# Patient Record
Sex: MALE | Race: WHITE | NOT HISPANIC OR LATINO | Employment: FULL TIME | URBAN - METROPOLITAN AREA
[De-identification: names, ages, dates, MRNs, and addresses within clinical notes are randomized per-mention and may not be internally consistent; named-entity substitution may affect disease eponyms.]

---

## 2018-04-12 ENCOUNTER — HOSPITAL ENCOUNTER (OUTPATIENT)
Dept: RADIOLOGY | Facility: MEDICAL CENTER | Age: 58
End: 2018-04-12
Attending: INTERNAL MEDICINE
Payer: COMMERCIAL

## 2018-04-12 DIAGNOSIS — D75.1 POLYCYTHEMIA, SECONDARY: ICD-10-CM

## 2018-04-12 PROCEDURE — 76700 US EXAM ABDOM COMPLETE: CPT

## 2018-05-18 ENCOUNTER — SLEEP CENTER VISIT (OUTPATIENT)
Dept: SLEEP MEDICINE | Facility: MEDICAL CENTER | Age: 58
End: 2018-05-18
Payer: COMMERCIAL

## 2018-05-18 VITALS
OXYGEN SATURATION: 93 % | HEIGHT: 77 IN | RESPIRATION RATE: 15 BRPM | SYSTOLIC BLOOD PRESSURE: 130 MMHG | DIASTOLIC BLOOD PRESSURE: 84 MMHG | HEART RATE: 79 BPM | BODY MASS INDEX: 29.64 KG/M2 | WEIGHT: 251 LBS

## 2018-05-18 DIAGNOSIS — D75.1 POLYCYTHEMIA: ICD-10-CM

## 2018-05-18 DIAGNOSIS — G47.30 SLEEP DISORDER BREATHING: ICD-10-CM

## 2018-05-18 PROCEDURE — 99203 OFFICE O/P NEW LOW 30 MIN: CPT | Performed by: FAMILY MEDICINE

## 2018-05-18 RX ORDER — OMEPRAZOLE 40 MG/1
40 CAPSULE, DELAYED RELEASE ORAL
Refills: 0 | COMMUNITY
Start: 2018-03-22

## 2018-05-18 RX ORDER — LEVOTHYROXINE SODIUM 175 UG/1
175 TABLET ORAL
Refills: 1 | COMMUNITY
Start: 2018-04-04

## 2018-05-18 RX ORDER — SIMVASTATIN 40 MG
40 TABLET ORAL
Refills: 5 | COMMUNITY
Start: 2018-05-01

## 2018-05-18 NOTE — PROGRESS NOTES
"     [facility]  Consult Note     Date: 5/18/2018 / Time: 8:14 AM    Patient ID:   Name:             Abner Magdaleno   YOB: 1960  Age:                 57 y.o.  male   MRN:               [id number]      Thank you for requesting a sleep medicine consultation on Abner Magdaleno at the sleep center. He presents today with the chief complaints of intermittent snoring and occasional excessive daytime sleepiness. He is referred by Dr. Petersen for evaluation and treatment of sleep disorder breathing . He was recently diagnosed with polycythemia.     HISTORY OF PRESENT ILLNESS:       At night,  Abner Magdaleno goes to bed around 11-12 pm on weekdays and on weekends. He gets out of bed at 7-8 am on weekdays and on weekends.  He averages 7 hrs of sleep on a good night and 5 hrs on a bad night. Pt has bad nights 1 nights per week. He falls asleep within 30 minutes. He awakens 2 times a night due to bathroom. It takes him few min to fall back asleep.      Rasta is aware of intermittent snoring but denies witnessed apneas/gasping or choking in sleep.  He  denies any symptoms of restless legs syndrome such as an \"urge to move\"  He  legs in the evening or bedtime. He  denies any symptoms of narcolepsy such as sleep paralysis or cataplexy, or any symptoms to suggest parasomnias such as sleep walking or acting out of dreams. He  has not used any medications for her sleep problem.    Overall,  Destiny finds his sleep refreshing. When He  wakes up in the morning, He does feel tired. In terms of  excessive daytime sleepiness,  He complains of sleepiness while reading or watching TV . [place] sleepiness scale score is abnormal at  7/24.   He  Does not take regular naps.   REVIEW OF SYSTEMS:       Constitutional: Denies fevers, Denies weight changes  Eyes: Denies changes in vision, no eye pain  Ears/Nose/Throat/Mouth: Denies nasal congestion or sore throat   Cardiovascular: Denies chest pain or palpitations " "  Respiratory: Denies shortness of breath , Denies cough  Gastrointestinal/Hepatic: Denies abdominal pain, nausea, vomiting, diarrhea, constipation or GI bleeding   Genitourinary: Denies bladder dysfunction, dysuria or frequency  Musculoskeletal/Rheum: Denies  joint pain and swelling   Skin/Breast: Denies rash, denies breast lumps or discharge  Neurological: Denies headache, confusion, memory loss or focal weakness/parasthesias  Psychiatric: denies mood disorder     Comprehensive review of systems form is reviewed with the patient and is attached in the EMR.     PMH:  has no past medical history on file.  MEDS:   Current Outpatient Prescriptions:   •  LEVOXYL 175 MCG Tab, Take 175 mcg by mouth every day., Disp: , Rfl: 1  •  omeprazole (PRILOSEC) 40 MG delayed-release capsule, Take 40 mg by mouth every day., Disp: , Rfl: 0  •  simvastatin (ZOCOR) 40 MG Tab, Take 40 mg by mouth every day., Disp: , Rfl: 5  •  Cholecalciferol (VITAMIN D PO), Take  by mouth., Disp: , Rfl:   ALLERGIES: No Known Allergies  SURGHX: History reviewed. No pertinent surgical history.  SOCHX:  reports that he has never smoked. He has never used smokeless tobacco. He reports that he drinks about 1.8 oz of alcohol per week . He reports that he does not use drugs..   FH:   Family History   Problem Relation Age of Onset   • Lung Cancer Mother    • Heart Attack Father    • Sleep Apnea Neg Hx            Physical Exam:  Vitals/ General Appearance:   Weight/BMI: Body mass index is 29.76 kg/m².  Blood pressure 130/84, pulse 79, resp. rate 15, height 1.956 m (6' 5\"), weight 113.9 kg (251 lb), SpO2 93 %.  Vitals:    05/18/18 0800   BP: 130/84   Pulse: 79   Resp: 15   SpO2: 93%   Weight: 113.9 kg (251 lb)   Height: 1.956 m (6' 5\")       Pt. is alert and oriented to time, place and person. Cooperative and in no apparent distress.       1. Head: Atraumatic, normocephalic.   2. Ears: Normal tympanic membrane and no discharge  3. Nose: No inferior turbinate " hypertophy, no septal deviation, no polyp.   4. Throat: Oropharynx appears crowded in that the palate is overhanging (Malam Evelin scale 4. Tonsils are 2+, uvula is large, pharynx not inflamed. Tongue is large. has intact dentition and oral hygiene is fair.  5. Neck: Supple. No thyromegaly  6. Chest: Trachea central  7. Lungs auscultation: B/L good air entry, vesicular breath sounds, no adventitious sounds  8. Heart auscultation: 1st and 2nd heart sounds normal, regular rhythm. No appreciable murmur.  9. Abdomen: Soft, non tender, no organomegaly. Bowel sounds present  10. Extremities:  no pedal edema.   Peripheral pulses felt.  11. Skin: No rash  12. NEUROLOGICAL EXAMINATION: On neurological exam, the patient was alert and oriented x3. speech was clear and fluent without dysarthria. INVESTIGATIONS:       ASSESSMENT AND PLAN     1. He  has symptoms of Obstructive Sleep Apnea (ROB). He  has excessive daytime sleepiness that interferes with activites of daily living. He  risk factors for ROB includeand crowded oropharynx.     The pathophysiology of ROB and the increased risk of cardiovascular morbidity from untreated ROB is discussed in detail with the patient. He  also has polycythemia which can be worsened by her ROB.     We have discussed diagnostic options including in-laboratory, attended polysomnography and home sleep testing. We have also discussed treatment options including airway pressurization, reconstructive otolaryngologic surgery, dental appliances and weight management.       Subsequently,treatment options will be discussed based on the diagnostic study. Meanwhile, He is urged to avoid supine sleep, weight gain and alcoholic beverages since all of these can worsen ROB. He is cautioned against drowsy driving. If He feels sleepy while driving, He must pull over for a break/nap, rather than persist on the road, in the interest of He own safety and that of others on the road.    Plan  -  He  will be  scheduled for an overnight  HST to assess sleep related  breathing disorder.    2. . Regarding treatment of other past medical problems and general health maintenance,  He is urged to follow up with PCP.

## 2018-05-18 NOTE — PATIENT INSTRUCTIONS
`Sleep Apnea  Sleep apnea is a condition in which breathing pauses or becomes shallow during sleep. Episodes of sleep apnea usually last 10 seconds or longer, and they may occur as many as 20 times an hour. Sleep apnea disrupts your sleep and keeps your body from getting the rest that it needs. This condition can increase your risk of certain health problems, including:  · Heart attack.  · Stroke.  · Obesity.  · Diabetes.  · Heart failure.  · Irregular heartbeat.  There are three kinds of sleep apnea:  · Obstructive sleep apnea. This kind is caused by a blocked or collapsed airway.  · Central sleep apnea. This kind happens when the part of the brain that controls breathing does not send the correct signals to the muscles that control breathing.  · Mixed sleep apnea. This is a combination of obstructive and central sleep apnea.  What are the causes?  The most common cause of this condition is a collapsed or blocked airway. An airway can collapse or become blocked if:  · Your throat muscles are abnormally relaxed.  · Your tongue and tonsils are larger than normal.  · You are overweight.  · Your airway is smaller than normal.  What increases the risk?  This condition is more likely to develop in people who:  · Are overweight.  · Smoke.  · Have a smaller than normal airway.  · Are elderly.  · Are male.  · Drink alcohol.  · Take sedatives or tranquilizers.  · Have a family history of sleep apnea.  What are the signs or symptoms?  Symptoms of this condition include:  · Trouble staying asleep.  · Daytime sleepiness and tiredness.  · Irritability.  · Loud snoring.  · Morning headaches.  · Trouble concentrating.  · Forgetfulness.  · Decreased interest in sex.  · Unexplained sleepiness.  · Mood swings.  · Personality changes.  · Feelings of depression.  · Waking up often during the night to urinate.  · Dry mouth.  · Sore throat.  How is this diagnosed?  This condition may be diagnosed with:  · A medical history.  · A physical  exam.  · A series of tests that are done while you are sleeping (sleep study). These tests are usually done in a sleep lab, but they may also be done at home.  How is this treated?  Treatment for this condition aims to restore normal breathing and to ease symptoms during sleep. It may involve managing health issues that can affect breathing, such as high blood pressure or obesity. Treatment may include:  · Sleeping on your side.  · Using a decongestant if you have nasal congestion.  · Avoiding the use of depressants, including alcohol, sedatives, and narcotics.  · Losing weight if you are overweight.  · Making changes to your diet.  · Quitting smoking.  · Using a device to open your airway while you sleep, such as:  ¨ An oral appliance. This is a custom-made mouthpiece that shifts your lower jaw forward.  ¨ A continuous positive airway pressure (CPAP) device. This device delivers oxygen to your airway through a mask.  ¨ A nasal expiratory positive airway pressure (EPAP) device. This device has valves that you put into each nostril.  ¨ A bi-level positive airway pressure (BPAP) device. This device delivers oxygen to your airway through a mask.  · Surgery if other treatments do not work. During surgery, excess tissue is removed to create a wider airway.  It is important to get treatment for sleep apnea. Without treatment, this condition can lead to:  · High blood pressure.  · Coronary artery disease.  · (Men) An inability to achieve or maintain an erection (impotence).  · Reduced thinking abilities.  Follow these instructions at home:  · Make any lifestyle changes that your health care provider recommends.  · Eat a healthy, well-balanced diet.  · Take over-the-counter and prescription medicines only as told by your health care provider.  · Avoid using depressants, including alcohol, sedatives, and narcotics.  · Take steps to lose weight if you are overweight.  · If you were given a device to open your airway while you  sleep, use it only as told by your health care provider.  · Do not use any tobacco products, such as cigarettes, chewing tobacco, and e-cigarettes. If you need help quitting, ask your health care provider.  · Keep all follow-up visits as told by your health care provider. This is important.  Contact a health care provider if:  · The device that you received to open your airway during sleep is uncomfortable or does not seem to be working.  · Your symptoms do not improve.  · Your symptoms get worse.  Get help right away if:  · You develop chest pain.  · You develop shortness of breath.  · You develop discomfort in your back, arms, or stomach.  · You have trouble speaking.  · You have weakness on one side of your body.  · You have drooping in your face.  These symptoms may represent a serious problem that is an emergency. Do not wait to see if the symptoms will go away. Get medical help right away. Call your local emergency services (911 in the [place] Do not drive yourself to the hospital.   This information is not intended to replace advice given to you by your health care provider. Make sure you discuss any questions you have with your health care provider.  Document Released: 12/08/2003 Document Revised: 08/13/2017 Document Reviewed: 09/26/2016  Elsevier Interactive Patient Education © 2017 [employer]

## 2018-06-07 ENCOUNTER — HOME STUDY (OUTPATIENT)
Dept: SLEEP MEDICINE | Facility: MEDICAL CENTER | Age: 58
End: 2018-06-07
Attending: FAMILY MEDICINE
Payer: COMMERCIAL

## 2018-06-07 DIAGNOSIS — D75.1 POLYCYTHEMIA: ICD-10-CM

## 2018-06-07 DIAGNOSIS — G47.30 SLEEP DISORDER BREATHING: ICD-10-CM

## 2018-06-07 PROCEDURE — 95806 SLEEP STUDY UNATT&RESP EFFT: CPT | Performed by: INTERNAL MEDICINE

## 2018-06-08 NOTE — PROCEDURES
The patient is a 57-year-old male with snoring and crowded airway with high clinical suspicion for obstructive sleep apnea.  Home polysomnography is requested for evaluation of suspected ROB.  He has no contraindications to home sleep study monitoring.    A total recording time of 7 hours was obtained with good-quality data noted.    There were 781 snore episodes noted associated with obstructive respiratory apneas.  The overall AHI was 56 events per hour.  In the supine position, the AHI was 82 events per hour.  There were noted oxygen desaturations below 88% for 4.5 hours, to a cj of 79%.  Average heart rate was 72 bpm.    Interpretation:  Very severe obstructive sleep apnea syndrome, AHI: 56 events/hr associated with desaturations to 79%.    Recommendations:  Given the severity of ROB, CPAP therapy is the optimal treatment.  A designated CPAP titration is recommended in the sleep laboratory.  Alternatively a trial on AutoPap could be considered.  Alternative therapies such as UPPP or a dental appliance are less likely to sufficiently treat this degree of ROB.  Avoidance of alcohol, sedatives and muscle relaxants is advised as these can exacerbate ROB.

## 2018-07-19 ENCOUNTER — HOSPITAL ENCOUNTER (OUTPATIENT)
Dept: RADIOLOGY | Facility: MEDICAL CENTER | Age: 58
End: 2018-07-19
Attending: NURSE PRACTITIONER
Payer: COMMERCIAL

## 2018-07-19 ENCOUNTER — OFFICE VISIT (OUTPATIENT)
Dept: URGENT CARE | Facility: PHYSICIAN GROUP | Age: 58
End: 2018-07-19
Payer: COMMERCIAL

## 2018-07-19 VITALS
RESPIRATION RATE: 15 BRPM | HEART RATE: 83 BPM | TEMPERATURE: 97.7 F | DIASTOLIC BLOOD PRESSURE: 92 MMHG | OXYGEN SATURATION: 96 % | HEIGHT: 77 IN | WEIGHT: 245 LBS | BODY MASS INDEX: 28.93 KG/M2 | SYSTOLIC BLOOD PRESSURE: 140 MMHG

## 2018-07-19 DIAGNOSIS — R10.32 GROIN PAIN, LEFT: ICD-10-CM

## 2018-07-19 PROCEDURE — 99203 OFFICE O/P NEW LOW 30 MIN: CPT | Performed by: NURSE PRACTITIONER

## 2018-07-19 PROCEDURE — 76857 US EXAM PELVIC LIMITED: CPT

## 2018-07-19 ASSESSMENT — ENCOUNTER SYMPTOMS
ABDOMINAL PAIN: 1
NAUSEA: 0
SHORTNESS OF BREATH: 0
FEVER: 0
CHILLS: 0
TINGLING: 0
DIARRHEA: 0
CONSTIPATION: 0
MYALGIAS: 0
SENSORY CHANGE: 0
VOMITING: 0

## 2018-07-19 ASSESSMENT — PAIN SCALES - GENERAL: PAINLEVEL: 7=MODERATE-SEVERE PAIN

## 2018-07-19 NOTE — PROGRESS NOTES
Subjective:      Abner Magdaleno is a 57 y.o. male who presents with Possible Hernia (x 1 week 1/2.)            HPI New problem. 57 year old male with left lower quadrant/groin pain for a week and a half. He denies fever, chills, myalgia, nausea or diarrhea/constipation. He has no urinary symptoms. Denies chest pain or shortness of breath. Last colonoscopy years ago but cannot remember results. No history of any heavy lifting. Thinks this may be hernia. Pain up yesterday, none today.  Patient has no known allergies.  Current Outpatient Prescriptions on File Prior to Visit   Medication Sig Dispense Refill   • LEVOXYL 175 MCG Tab Take 175 mcg by mouth every day.  1   • omeprazole (PRILOSEC) 40 MG delayed-release capsule Take 40 mg by mouth every day.  0   • simvastatin (ZOCOR) 40 MG Tab Take 40 mg by mouth every day.  5   • Cholecalciferol (VITAMIN D PO) Take  by mouth.       No current facility-administered medications on file prior to visit.      Social History     Social History   • Marital status:      Spouse name: N/A   • Number of children: N/A   • Years of education: N/A     Occupational History   • Not on file.     Social History Main Topics   • Smoking status: Never Smoker   • Smokeless tobacco: Never Used   • Alcohol use 1.8 oz/week     3 Cans of beer per week   • Drug use: No   • Sexual activity: Not on file     Other Topics Concern   • Not on file     Social History Narrative   • No narrative on file     family history includes Heart Attack in his father; Lung Cancer in his mother.      Review of Systems   Constitutional: Negative for chills, fever and malaise/fatigue.   Respiratory: Negative for shortness of breath.    Cardiovascular: Negative for chest pain.   Gastrointestinal: Positive for abdominal pain. Negative for constipation, diarrhea, nausea and vomiting.   Genitourinary: Negative for dysuria, frequency and urgency.   Musculoskeletal: Negative for myalgias.   Skin: Negative for rash.  "  Neurological: Negative for tingling and sensory change.          Objective:     /92   Pulse 83   Temp 36.5 °C (97.7 °F)   Resp 15   Ht 1.956 m (6' 5\")   Wt 111.1 kg (245 lb)   SpO2 96%   BMI 29.05 kg/m²      Physical Exam   Constitutional: He is oriented to person, place, and time. He appears well-developed and well-nourished. No distress.   HENT:   Head: Normocephalic and atraumatic.   Right Ear: External ear and ear canal normal. Tympanic membrane is not injected and not perforated. No middle ear effusion.   Left Ear: External ear and ear canal normal. Tympanic membrane is not injected and not perforated.  No middle ear effusion.   Nose: No mucosal edema.   Mouth/Throat: No oropharyngeal exudate or posterior oropharyngeal erythema.   Eyes: Conjunctivae are normal. Right eye exhibits no discharge. Left eye exhibits no discharge.   Neck: Normal range of motion. Neck supple.   Cardiovascular: Normal rate, regular rhythm and normal heart sounds.    No murmur heard.  Pulmonary/Chest: Effort normal and breath sounds normal. No respiratory distress.   Abdominal: Soft. Bowel sounds are normal. He exhibits no distension. There is tenderness in the left lower quadrant. There is no CVA tenderness.   Musculoskeletal: Normal range of motion.   Normal movement of all 4 extremities.   Lymphadenopathy:     He has no cervical adenopathy.        Right: No supraclavicular adenopathy present.        Left: No supraclavicular adenopathy present.   Neurological: He is alert and oriented to person, place, and time. Gait normal.   Skin: Skin is warm and dry.   Psychiatric: He has a normal mood and affect. His behavior is normal. Thought content normal.   Vitals reviewed.              Assessment/Plan:     1. Groin pain, left  US-INGUINAL HERNIA    REFERRAL TO GENERAL SURGERY     Referral to surgery, he has 2 inguinal hernia, each side.   I have reviewed strict ED precautions with the patient for any increase pain that is " unremitting.  Differential diagnosis, natural history, supportive care, and indications for immediate follow-up discussed at length.

## 2018-08-02 ENCOUNTER — HOSPITAL ENCOUNTER (OUTPATIENT)
Dept: HOSPITAL 8 - STAR | Age: 58
Discharge: HOME | End: 2018-08-02
Attending: SURGERY
Payer: COMMERCIAL

## 2018-08-02 VITALS — DIASTOLIC BLOOD PRESSURE: 98 MMHG | SYSTOLIC BLOOD PRESSURE: 139 MMHG

## 2018-08-02 DIAGNOSIS — Z02.9: Primary | ICD-10-CM

## 2018-08-07 ENCOUNTER — HOSPITAL ENCOUNTER (OUTPATIENT)
Dept: HOSPITAL 8 - OUT | Age: 58
Discharge: HOME | End: 2018-08-07
Attending: SURGERY
Payer: COMMERCIAL

## 2018-08-07 VITALS — HEIGHT: 77 IN | WEIGHT: 242.51 LBS | BODY MASS INDEX: 28.63 KG/M2

## 2018-08-07 VITALS — DIASTOLIC BLOOD PRESSURE: 90 MMHG | SYSTOLIC BLOOD PRESSURE: 138 MMHG

## 2018-08-07 DIAGNOSIS — Z85.850: ICD-10-CM

## 2018-08-07 DIAGNOSIS — K40.91: Primary | ICD-10-CM

## 2018-08-07 DIAGNOSIS — E89.0: ICD-10-CM

## 2018-08-07 DIAGNOSIS — G47.33: ICD-10-CM

## 2018-08-07 DIAGNOSIS — K22.70: ICD-10-CM

## 2018-08-07 DIAGNOSIS — Z72.89: ICD-10-CM

## 2018-08-07 DIAGNOSIS — Z79.899: ICD-10-CM

## 2018-08-07 DIAGNOSIS — Z98.890: ICD-10-CM

## 2018-08-07 DIAGNOSIS — K21.9: ICD-10-CM

## 2018-08-07 DIAGNOSIS — E78.00: ICD-10-CM

## 2018-08-07 DIAGNOSIS — K31.9: ICD-10-CM

## 2018-08-07 DIAGNOSIS — K40.90: ICD-10-CM

## 2018-08-07 PROCEDURE — 49650 LAP ING HERNIA REPAIR INIT: CPT

## 2018-08-07 PROCEDURE — C1781 MESH (IMPLANTABLE): HCPCS

## 2018-08-07 PROCEDURE — C1727 CATH, BAL TIS DIS, NON-VAS: HCPCS

## 2018-08-07 PROCEDURE — 49651 LAP ING HERNIA REPAIR RECUR: CPT

## 2018-11-05 ENCOUNTER — SLEEP CENTER VISIT (OUTPATIENT)
Dept: SLEEP MEDICINE | Facility: MEDICAL CENTER | Age: 58
End: 2018-11-05
Payer: COMMERCIAL

## 2018-11-05 ENCOUNTER — APPOINTMENT (OUTPATIENT)
Dept: SLEEP MEDICINE | Facility: MEDICAL CENTER | Age: 58
End: 2018-11-05
Payer: COMMERCIAL

## 2018-11-05 VITALS
WEIGHT: 241 LBS | HEIGHT: 77 IN | BODY MASS INDEX: 28.46 KG/M2 | HEART RATE: 69 BPM | DIASTOLIC BLOOD PRESSURE: 90 MMHG | OXYGEN SATURATION: 96 % | SYSTOLIC BLOOD PRESSURE: 138 MMHG | RESPIRATION RATE: 16 BRPM

## 2018-11-05 DIAGNOSIS — Z78.9 NON-SMOKER: ICD-10-CM

## 2018-11-05 DIAGNOSIS — G47.33 OBSTRUCTIVE SLEEP APNEA: ICD-10-CM

## 2018-11-05 DIAGNOSIS — E66.3 OVERWEIGHT (BMI 25.0-29.9): ICD-10-CM

## 2018-11-05 DIAGNOSIS — Z23 NEED FOR INFLUENZA VACCINATION: ICD-10-CM

## 2018-11-05 PROCEDURE — 99214 OFFICE O/P EST MOD 30 MIN: CPT | Performed by: INTERNAL MEDICINE

## 2018-11-05 NOTE — PROGRESS NOTES
"CC: Follow up for home sleep test results    HPI:  Mr. Jagdish Magdaleno was first seen on 5/18/2018 for snoring and excessive daytime somnolence.  His home sleep study was performed on 6/7/2018 and showed an AHI of 56, a cj saturation of 79%, and saturations were less than 88% for 4.5 hours.    Patient Active Problem List    Diagnosis Date Noted   • Obstructive sleep apnea 11/05/2018   • Overweight (BMI 25.0-29.9) 11/05/2018   • Non-smoker 11/05/2018       History reviewed. No pertinent past medical history.     History reviewed. No pertinent surgical history.    Family History   Problem Relation Age of Onset   • Lung Cancer Mother    • Heart Attack Father    • Sleep Apnea Neg Hx        Social History     Social History   • Marital status:      Spouse name: N/A   • Number of children: N/A   • Years of education: N/A     Occupational History   • Not on file.     Social History Main Topics   • Smoking status: Never Smoker   • Smokeless tobacco: Never Used   • Alcohol use 1.8 oz/week     3 Cans of beer per week   • Drug use: No   • Sexual activity: Not on file     Other Topics Concern   • Not on file     Social History Narrative   • No narrative on file       Current Outpatient Prescriptions   Medication Sig Dispense Refill   • LEVOXYL 175 MCG Tab Take 175 mcg by mouth every day.  1   • omeprazole (PRILOSEC) 40 MG delayed-release capsule Take 40 mg by mouth every day.  0   • simvastatin (ZOCOR) 40 MG Tab Take 40 mg by mouth every day.  5   • Cholecalciferol (VITAMIN D PO) Take  by mouth.       No current facility-administered medications for this visit.     \"CURRENT RX\"    ALLERGIES: Patient has no known allergies.    ROS    Constitutional: Denies fever, chills, sweats,  weight loss, fatigue  Cardiovascular: Denies chest pain, tightness, palpitations, swelling in legs/feet, fainting, difficulty breathing when lying down but gets better when sitting up.   Respiratory: Denies shortness of breath, cough, sputum, " "wheezing, painful breathing, coughing up blood.   Sleep: per HPI  Gastrointestinal: Denies  difficulty swallowing, nausea, abdominal pain, diarrhea, constipation, heartburn..   Musculoskeletal: Denies painful joints, sore muscles, back pain.        PHYSICAL EXAM  Overweight but not obese    /90 (BP Location: Right arm, Patient Position: Sitting, BP Cuff Size: Adult)   Pulse 69   Resp 16   Ht 1.956 m (6' 5\")   Wt 109.3 kg (241 lb)   SpO2 96%   BMI 28.58 kg/m²   Appearance: Well-nourished, well-developed, no acute distress  Eyes:  PERRLA, EOMI  ENMT: without lesions, deformities;hearing grossly intact; tongue normal, posterior pharynx without erythema or exudate; Mallampati classification: 4, enlarged tonsils, enlarged uvula  Neck: Supple, trachea midline, no masses  Respiratory effort:  No intercostal retractions or use of accessory muscles  Lung auscultation:  No wheezes rhonchi rubs or rales  Cardiac: No murmurs, rubs, or gallops; regular rhythm, normal rate; no edema  Abdomen:  No tenderness, no organomegaly  Musculoskeletal:  Grossly normal; gait and station normal; digits and nails normal  Skin:  No rashes, petechiae, cyanosis  Neurologic: without focal signs; oriented to person, time, place, and purpose; judgement intact  Psychiatric:  No depression, anxiety, agitation        PROBLEMS:  1. Obstructive sleep apnea    - DME CPAP    2. Overweight (BMI 25.0-29.9)      3. Need for influenza vaccination      4. Non-smoker        PLAN:   Mr. Jagdish Magdaleno was first seen on 5/18/2018 for snoring and excessive daytime somnolence.  His home sleep study was performed on 6/7/2018 and showed an AHI of 56, a cj saturation of 79%, and saturations were less than 88% for 4.5 hours.    We discussed an attended positive airway pressure titration versus a trial of auto titrating CPAP.  He prefers to try auto titrating CPAP but recognizes that if this is unsuccessful he would need to return for an attended " positive airway pressure titration nocturnal polysomnogram.      Return in about 10 weeks (around 1/14/2019).

## 2019-01-23 NOTE — PROGRESS NOTES
CC: Follow up for ROB on auto titrating CPAP    HPI:  Mr. Jagdish Magdaleno is a 58-year-old male who works for [employer] and was first seen on 5/18/2018 for snoring and excessive daytime somnolence.  His home sleep study was performed on 6/7/2018 and showed an AHI of 56, a cj saturation of 79%, and saturations were less than 88% for 4.5 hours.    We discussed an attended positive airway pressure titration versus a trial of auto titrating CPAP.  He prefers to try auto titrating CPAP but recognizes that if this is unsuccessful he would need to return for an attended positive airway pressure titration nocturnal polysomnogram.    The patient returns today then for a clinical and compliance review.  30-day compliance is 100% for 5 hours and 34 minutes with a residual apnea hypopnea index of 9.2.  He is on auto CPAP Pap of 5-15 cm H2O and his auto CPAP mean pressure is 6.8.    He is feeling better on CPAP.  He works part-time at [employer] at a [employer].  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    He is followed by Dr. Petersen from hematology for the polycythemia.  He had a recent CBC which is not in the [facility] results section but he relates that his hemoglobin was greater than 18.  We discussed the lifespan of red blood cells being approximately 120 days.  I suspect that over time his hemoglobin will normalize without  phlebotomy but he will be seeing Dr. Petersen next month who can decide if any other therapeutic or diagnostic endeavors are indicated.    Significant modifying factors and comorbidities include overweight, non-smoker, hypothyroidism, GERD, dyslipidemia, history of polycythemia, and need for influenza vaccination    Patient Active Problem List    Diagnosis Date Noted   • Obstructive sleep apnea 11/05/2018   • Overweight (BMI 25.0-29.9) 11/05/2018   • Non-smoker 11/05/2018       History reviewed. No pertinent past medical history.     History  "reviewed. No pertinent surgical history.    Family History   Problem Relation Age of Onset   • Lung Cancer Mother    • Heart Attack Father    • Sleep Apnea Neg Hx        Social History     Social History   • Marital status:      Spouse name: N/A   • Number of children: N/A   • Years of education: N/A     Occupational History   • Not on file.     Social History Main Topics   • Smoking status: Never Smoker   • Smokeless tobacco: Never Used   • Alcohol use 1.8 oz/week     3 Cans of beer per week   • Drug use: No   • Sexual activity: Not on file     Other Topics Concern   • Not on file     Social History Narrative   • No narrative on file       Current Outpatient Prescriptions   Medication Sig Dispense Refill   • LEVOXYL 175 MCG Tab Take 175 mcg by mouth every day.  1   • omeprazole (PRILOSEC) 40 MG delayed-release capsule Take 40 mg by mouth every day.  0   • simvastatin (ZOCOR) 40 MG Tab Take 40 mg by mouth every day.  5   • Cholecalciferol (VITAMIN D PO) Take  by mouth.       No current facility-administered medications for this visit.     \"CURRENT RX\"    ALLERGIES: Patient has no known allergies.    ROS    Constitutional: Denies fever, chills, sweats,  weight loss, fatigue  Cardiovascular: Denies chest pain, tightness, palpitations, swelling in legs/feet, fainting, difficulty breathing when lying down but gets better when sitting up.   Respiratory: Denies shortness of breath, cough, sputum, wheezing, painful breathing, coughing up blood.   Sleep: per HPI  Gastrointestinal: Denies  difficulty swallowing, nausea, abdominal pain, diarrhea, constipation, heartburn..   Musculoskeletal: Denies painful joints, sore muscles, back pain.        PHYSICAL EXAM      /88 (BP Location: Right arm, Patient Position: Sitting, BP Cuff Size: Adult)   Pulse 68   Resp 16   Ht 1.956 m (6' 5\")   Wt 109.8 kg (242 lb)   SpO2 95%   BMI 28.70 kg/m²   Appearance: Well-nourished, well-developed, no acute distress  Eyes:  " LAWRENCE, SHANTELMI  ENMT: without lesions, deformities;hearing grossly intact; tongue normal, posterior pharynx without erythema or exudate; Mallampati classification:   Neck: Supple, trachea midline, no masses  Respiratory effort:  No intercostal retractions or use of accessory muscles  Lung auscultation:  No wheezes rhonchi rubs or rales  Cardiac: No murmurs, rubs, or gallops; regular rhythm, normal rate; no edema  Abdomen:  No tenderness, no organomegaly  Musculoskeletal:  Grossly normal; gait and station normal; digits and nails normal  Skin:  No rashes, petechiae, cyanosis  Neurologic: without focal signs; oriented to person, time, place, and purpose; judgement intact  Psychiatric:  No depression, anxiety, agitation        PROBLEMS:  1. Obstructive sleep apnea      2. Non-smoker      3. Overweight (BMI 25.0-29.9)      4. Need for influenza vaccination          PLAN:     Return in about 4 months (around 5/24/2019).    Has been advised to continue the current auto titrating CPAP 5-15 cm H2O, clean equipment frequently, and get new mask and supplies as allowed by insurance and DME. Advised about potential problems including nasal obstruction/stuffiness, mask leak or discomfort , frequent awakenings, chill or dryness of the upper airway, noise, inconvenience, and lack of benefit. Recommend an earlier appointment, if significant treatment barriers develop.    Have advised the patient to follow up with the appropriate healthcare practitioners for all other medical problems and issues.

## 2019-01-24 ENCOUNTER — SLEEP CENTER VISIT (OUTPATIENT)
Dept: SLEEP MEDICINE | Facility: MEDICAL CENTER | Age: 59
End: 2019-01-24
Payer: COMMERCIAL

## 2019-01-24 VITALS
WEIGHT: 242 LBS | SYSTOLIC BLOOD PRESSURE: 130 MMHG | BODY MASS INDEX: 28.57 KG/M2 | DIASTOLIC BLOOD PRESSURE: 88 MMHG | OXYGEN SATURATION: 95 % | HEART RATE: 68 BPM | RESPIRATION RATE: 16 BRPM | HEIGHT: 77 IN

## 2019-01-24 DIAGNOSIS — Z23 NEED FOR INFLUENZA VACCINATION: ICD-10-CM

## 2019-01-24 DIAGNOSIS — E66.3 OVERWEIGHT (BMI 25.0-29.9): ICD-10-CM

## 2019-01-24 DIAGNOSIS — Z78.9 NON-SMOKER: ICD-10-CM

## 2019-01-24 DIAGNOSIS — G47.33 OBSTRUCTIVE SLEEP APNEA: ICD-10-CM

## 2019-01-24 PROCEDURE — 99214 OFFICE O/P EST MOD 30 MIN: CPT | Performed by: INTERNAL MEDICINE

## 2019-05-22 NOTE — PROGRESS NOTES
CC: Four-month follow-up for ROB on AutoPap 5 to 15 cm H2O    HPI:  Mr. Jagdish Magdaleno is a 58-year-old male who works part-time at [employer] at [employer] and was first seen on 5/18/2018 for snoring and excessive daytime somnolence.  His home sleep study was performed on 6/7/2018 and showed an AHI of 56, a cj saturation of 79%, and saturations were less than 88% for 4.5 hours.  He was empirically placed on auto titrating CPAP 5 to 15 cm H2O.  When last seen his compliance was excellent.    The patient reports improved symptoms using positive airway pressure.  Has experienced no significant problems with mask fit, mask leak, pressure tolerance, excessive airway dryness, nasal congestion, aerophagia, or chest pain. Has worked through the [employer].      Today, his 30-day compliance is 100% for 5 hours and 6 minutes.  He has a residual average apnea hypopnea index of 10.3.  He is not having any significant leak.  His auto CPAP mean pressure is 7 cm H2O and his auto CPAP peak average pressure is 9.0 cm H2O.  The increased average AHI may be secondary to some central apneas.  His home study performed on 6/7/2018 his central index was 9.2.    Significant modifying factors and comorbidities include overweight, non-smoker, hypothyroidism, GERD, dyslipidemia, history of polycythemia followed by Dr. Petersen, and need for influenza vaccination. Due to have his next CBC in August to check the polycythemia.    Patient Active Problem List    Diagnosis Date Noted   • Dyslipidemia 05/23/2019   • Gastroesophageal reflux 05/23/2019   • Other specified hypothyroidism 05/23/2019   • Polycythemia 05/23/2019   • Obstructive sleep apnea 11/05/2018   • Overweight (BMI 25.0-29.9) 11/05/2018   • Non-smoker 11/05/2018       History reviewed. No pertinent past medical history.     History reviewed. No pertinent surgical history.    Family History   Problem Relation Age of Onset   • Lung Cancer Mother    • Heart Attack Father   "  • Sleep Apnea Neg Hx        Social History     Social History   • Marital status:      Spouse name: N/A   • Number of children: N/A   • Years of education: N/A     Occupational History   • Not on file.     Social History Main Topics   • Smoking status: Never Smoker   • Smokeless tobacco: Never Used   • Alcohol use 1.8 oz/week     3 Cans of beer per week   • Drug use: No   • Sexual activity: Not on file     Other Topics Concern   • Not on file     Social History Narrative   • No narrative on file       Current Outpatient Prescriptions   Medication Sig Dispense Refill   • LEVOXYL 175 MCG Tab Take 175 mcg by mouth every day.  1   • omeprazole (PRILOSEC) 40 MG delayed-release capsule Take 40 mg by mouth every day.  0   • simvastatin (ZOCOR) 40 MG Tab Take 40 mg by mouth every day.  5   • Cholecalciferol (VITAMIN D PO) Take  by mouth.       No current facility-administered medications for this visit.     \"CURRENT RX\"    ALLERGIES: Patient has no known allergies.    ROS    Constitutional: Denies fever, chills, sweats,  weight loss, fatigue  Cardiovascular: Denies chest pain, tightness, palpitations, swelling in legs/feet, fainting, difficulty breathing when lying down but gets better when sitting up.   Respiratory: Denies shortness of breath, cough, sputum, wheezing, painful breathing, coughing up blood.   Sleep: per HPI  Gastrointestinal: Denies  difficulty swallowing, nausea, abdominal pain, diarrhea, constipation, heartburn.  Musculoskeletal: Denies painful joints, sore muscles, back pain.        PHYSICAL EXAM  Overweight but not obese    /72 (BP Location: Right arm, Patient Position: Sitting, BP Cuff Size: Adult)   Pulse 78   Resp 16   Ht 1.956 m (6' 5\")   Wt 109.8 kg (242 lb)   SpO2 95%   BMI 28.70 kg/m²   Appearance: Well-nourished, well-developed, no acute distress  Eyes:  PERRLA, EOMI  ENMT: without lesions, deformities;hearing grossly intact; tongue normal, posterior pharynx without erythema " or exudate; Mallampati classification: 4  Neck: Supple, trachea midline, no masses  Respiratory effort:  No intercostal retractions or use of accessory muscles  Lung auscultation:  No wheezes rhonchi rubs or rales  Cardiac: No murmurs, rubs, or gallops; regular rhythm, normal rate; no edema  Abdomen:  No tenderness, no organomegaly  Musculoskeletal:  Grossly normal; gait and station normal; digits and nails normal  Skin:  No rashes, petechiae, cyanosis  Neurologic: without focal signs; oriented to person, time, place, and purpose; judgement intact  Psychiatric:  No depression, anxiety, agitation        PROBLEMS:  1. Obstructive sleep apnea      2. Non-smoker      3. Overweight (BMI 25.0-29.9)      4. Dyslipidemia      5. Gastroesophageal reflux disease, esophagitis presence not specified      6. Other specified hypothyroidism      7. Polycythemia        PLAN:     Return in about 6 months (around 11/23/2019).    Has been advised to continue the current AutoPap 5 to 15 cm H2O, clean equipment frequently, and get new mask and supplies as allowed by insurance and DME. Advised about potential problems including nasal obstruction/stuffiness, mask leak or discomfort , frequent awakenings, chill or dryness of the upper airway, noise, inconvenience, and lack of benefit. Recommend an earlier appointment, if significant treatment barriers develop.    His elevated residual average apnea hypopnea index is likely secondary to persistent central apneas.  Is 6/7/2018 home study showed a central index of 9.2.    Have advised the patient to follow up with the appropriate healthcare practitioners for all other medical problems and issues.    If she is August hemoglobin and hematocrit remain elevated, would recommend performing a continuous nocturnal oximetry while on Pap to ensure that saturations are adequate at his [place].

## 2019-05-23 ENCOUNTER — SLEEP CENTER VISIT (OUTPATIENT)
Dept: SLEEP MEDICINE | Facility: MEDICAL CENTER | Age: 59
End: 2019-05-23
Payer: COMMERCIAL

## 2019-05-23 VITALS
HEART RATE: 78 BPM | BODY MASS INDEX: 28.57 KG/M2 | WEIGHT: 242 LBS | OXYGEN SATURATION: 95 % | DIASTOLIC BLOOD PRESSURE: 72 MMHG | SYSTOLIC BLOOD PRESSURE: 104 MMHG | HEIGHT: 77 IN | RESPIRATION RATE: 16 BRPM

## 2019-05-23 DIAGNOSIS — E03.8 OTHER SPECIFIED HYPOTHYROIDISM: ICD-10-CM

## 2019-05-23 DIAGNOSIS — G47.33 OBSTRUCTIVE SLEEP APNEA: ICD-10-CM

## 2019-05-23 DIAGNOSIS — E66.3 OVERWEIGHT (BMI 25.0-29.9): ICD-10-CM

## 2019-05-23 DIAGNOSIS — K21.9 GASTROESOPHAGEAL REFLUX DISEASE, ESOPHAGITIS PRESENCE NOT SPECIFIED: ICD-10-CM

## 2019-05-23 DIAGNOSIS — Z78.9 NON-SMOKER: ICD-10-CM

## 2019-05-23 DIAGNOSIS — E78.5 DYSLIPIDEMIA: ICD-10-CM

## 2019-05-23 DIAGNOSIS — D75.1 POLYCYTHEMIA: ICD-10-CM

## 2019-05-23 PROCEDURE — 99214 OFFICE O/P EST MOD 30 MIN: CPT | Performed by: INTERNAL MEDICINE

## 2019-10-28 NOTE — PROGRESS NOTES
CC:  Follow-up for ROB on AutoPap 5 to 15 cm H2O     HPI:  Mr. Jagdish Magdaleno is a 58-year-old male who works part-time at [employer] at [employer] and was first seen on 5/18/2018 for snoring and excessive daytime somnolence.  His home sleep study was performed on 6/7/2018 and showed an AHI of 56, a cj saturation of 79%, and saturations were less than 88% for 4.5 hours.  He was empirically placed on auto titrating CPAP 5 to 15 cm H2O.  When last seen his compliance was excellent.     The patient reports improved symptoms using positive airway pressure.  Has experienced no significant problems with mask fit, mask leak, pressure tolerance, excessive airway dryness, nasal congestion, aerophagia, or chest pain. Has worked through the [facility] finally.     Today, 10/29/2019, his 30-day compliance is 100% for 5 hours and 31 minutes.  He has an average residual apnea hypopnea index of 9.9 on the auto titrating CPAP set at CPAP mean pressure is 7.1.  He may be having some central apneas accounted for the slightly elevated average AHI. Current ESS is 3.    His hgb has fallen from 18.4 to 18.1.      Significant modifying factors and comorbidities include  non-smoker, hypothyroidism, GERD, dyslipidemia, history of polycythemia followed by Dr. Petersen, and need for influenza vaccination.       Patient Active Problem List    Diagnosis Date Noted   • Need for influenza vaccination 10/29/2019   • Dyslipidemia 05/23/2019   • Gastroesophageal reflux 05/23/2019   • Other specified hypothyroidism 05/23/2019   • Polycythemia 05/23/2019   • Obstructive sleep apnea 11/05/2018   • Non-smoker 11/05/2018       History reviewed. No pertinent past medical history.     History reviewed. No pertinent surgical history.    Family History   Problem Relation Age of Onset   • Lung Cancer Mother    • Heart Attack Father    • Sleep Apnea Neg Hx        Social History     Socioeconomic History   • Marital status:      Spouse name: Not on  "file   • Number of children: Not on file   • Years of education: Not on file   • Highest education level: Not on file   Occupational History   • Not on file   Social Needs   • Financial resource strain: Not on file   • Food insecurity:     Worry: Not on file     Inability: Not on file   • Transportation needs:     Medical: Not on file     Non-medical: Not on file   Tobacco Use   • Smoking status: Never Smoker   • Smokeless tobacco: Never Used   Substance and Sexual Activity   • Alcohol use: Yes     Alcohol/week: 1.8 oz     Types: 3 Cans of beer per week   • Drug use: No   • Sexual activity: Not on file   Lifestyle   • Physical activity:     Days per week: Not on file     Minutes per session: Not on file   • Stress: Not on file   Relationships   • Social connections:     Talks on phone: Not on file     Gets together: Not on file     Attends Yazidism service: Not on file     Active member of club or organization: Not on file     Attends meetings of clubs or organizations: Not on file     Relationship status: Not on file   • Intimate partner violence:     Fear of current or ex partner: Not on file     Emotionally abused: Not on file     Physically abused: Not on file     Forced sexual activity: Not on file   Other Topics Concern   • Not on file   Social History Narrative   • Not on file       Current Outpatient Medications   Medication Sig Dispense Refill   • LEVOXYL 175 MCG Tab Take 175 mcg by mouth every day.  1   • omeprazole (PRILOSEC) 40 MG delayed-release capsule Take 40 mg by mouth every day.  0   • simvastatin (ZOCOR) 40 MG Tab Take 40 mg by mouth every day.  5   • Cholecalciferol (VITAMIN D PO) Take  by mouth.       No current facility-administered medications for this visit.     \"CURRENT RX\"    ALLERGIES: Patient has no known allergies.    ROS    Constitutional: Denies fever, chills, sweats,  weight loss, fatigue  Cardiovascular: Denies chest pain, tightness, palpitations, swelling in legs/feet, fainting, " "difficulty breathing when lying down but gets better when sitting up.   Respiratory: Denies shortness of breath, cough, sputum, wheezing, painful breathing, coughing up blood.   Sleep: per HPI  Gastrointestinal: Denies  difficulty swallowing, nausea, abdominal pain, diarrhea, constipation, heartburn.  Musculoskeletal: Denies painful joints, sore muscles, back pain.        PHYSICAL EXAM      /84 (BP Location: Right arm, Patient Position: Sitting, BP Cuff Size: Adult)   Pulse 77   Resp 16   Ht 1.956 m (6' 5\")   Wt 110.7 kg (244 lb)   SpO2 95%   BMI 28.93 kg/m²   Appearance: Well-nourished, well-developed, no acute distress  Eyes:  PERRLA, EOMI  ENMT: without lesions, deformities;hearing grossly intact; tongue normal, posterior pharynx without erythema or exudate;   Modified Mallampati (AKA Freidman) Score:  Neck: Supple, trachea midline, no masses  Respiratory effort:  No intercostal retractions or use of accessory muscles  Lung auscultation:  No wheezes rhonchi rubs or rales  Cardiac: No murmurs, rubs, or gallops; regular rhythm, normal rate; no edema  Abdomen:  No tenderness, no organomegaly  Musculoskeletal:  Grossly normal; gait and station normal; digits and nails normal  Skin:  No rashes, petechiae, cyanosis  Neurologic: without focal signs; oriented to person, time, place, and purpose; judgement intact  Psychiatric:  No depression, anxiety, agitation        PROBLEMS:  1. Obstructive sleep apnea    - DME Mask and Supplies  - Overnight Oximetry; Future    2. Non-smoker      3. Dyslipidemia      4. Gastroesophageal reflux disease, esophagitis presence not specified      5. Overweight (BMI 25.0-29.9)      6. Need for influenza vaccination      7. Polycythemia            PLAN:   Has been advised to continue the current AutoPap 5 to 15 cm H2O, clean equipment frequently, and get new mask and supplies as allowed by insurance and DME. Advised about potential problems including nasal obstruction/stuffiness, " mask leak or discomfort , frequent awakenings, chill or dryness of the upper airway, noise, inconvenience, and lack of benefit. Recommend an earlier appointment, if significant treatment barriers develop.    Have advised the patient to follow up with the appropriate healthcare practitioners for all other medical problems and issues.      Return in about 6 months (around 4/29/2020).      Given the persisting polycythemia, will do a home overnight oximetry on PAP to determine if bleed-in O2 is needed. He can call for the results.

## 2019-10-29 ENCOUNTER — HOME STUDY (OUTPATIENT)
Dept: SLEEP MEDICINE | Facility: MEDICAL CENTER | Age: 59
End: 2019-10-29
Attending: INTERNAL MEDICINE
Payer: COMMERCIAL

## 2019-10-29 ENCOUNTER — SLEEP CENTER VISIT (OUTPATIENT)
Dept: SLEEP MEDICINE | Facility: MEDICAL CENTER | Age: 59
End: 2019-10-29
Payer: COMMERCIAL

## 2019-10-29 VITALS
HEIGHT: 77 IN | SYSTOLIC BLOOD PRESSURE: 112 MMHG | WEIGHT: 244 LBS | HEART RATE: 77 BPM | RESPIRATION RATE: 16 BRPM | DIASTOLIC BLOOD PRESSURE: 84 MMHG | BODY MASS INDEX: 28.81 KG/M2 | OXYGEN SATURATION: 95 %

## 2019-10-29 DIAGNOSIS — Z78.9 NON-SMOKER: ICD-10-CM

## 2019-10-29 DIAGNOSIS — E78.5 DYSLIPIDEMIA: ICD-10-CM

## 2019-10-29 DIAGNOSIS — E66.3 OVERWEIGHT (BMI 25.0-29.9): ICD-10-CM

## 2019-10-29 DIAGNOSIS — K21.9 GASTROESOPHAGEAL REFLUX DISEASE, ESOPHAGITIS PRESENCE NOT SPECIFIED: ICD-10-CM

## 2019-10-29 DIAGNOSIS — D75.1 POLYCYTHEMIA: ICD-10-CM

## 2019-10-29 DIAGNOSIS — G47.33 OBSTRUCTIVE SLEEP APNEA: ICD-10-CM

## 2019-10-29 DIAGNOSIS — Z23 NEED FOR INFLUENZA VACCINATION: ICD-10-CM

## 2019-10-29 PROCEDURE — 94762 N-INVAS EAR/PLS OXIMTRY CONT: CPT | Performed by: INTERNAL MEDICINE

## 2019-10-29 PROCEDURE — 99214 OFFICE O/P EST MOD 30 MIN: CPT | Performed by: INTERNAL MEDICINE

## 2019-10-29 RX ORDER — AMOXICILLIN 500 MG/1
500 CAPSULE ORAL
Refills: 0 | COMMUNITY
Start: 2019-08-27 | End: 2019-10-28

## 2019-10-31 NOTE — PROCEDURES
Interpretation:    This overnight oximetry was recorded on 10/30/2019 with the patient on auto titrating CPAP 5 to 15 cm H2O.  The analysis duration was 3 hours 5 minutes .  For total oximetric events occurred encompassing 1.1 minutes .  The average event duration was 17.06 seconds .  The saturations were less than 90% for 2.6 % of the recording.  The cj saturation was 87 % .  The patient spent 0.1 minutes with saturations < 88%.  Overall, this continuous nocturnal oximetry demonstrates fairly normal nocturnal oximetry while on positive airway pressure therapy.    Recommendation:    Clinical correlation is needed.  Recommend continuing auto titrating CPAP 5 to 15 cm H2O.

## 2020-01-02 ENCOUNTER — HOSPITAL ENCOUNTER (EMERGENCY)
Dept: HOSPITAL 8 - ED | Age: 60
LOS: 1 days | Discharge: HOME | End: 2020-01-03
Payer: COMMERCIAL

## 2020-01-02 VITALS — HEIGHT: 77 IN | WEIGHT: 242.51 LBS | BODY MASS INDEX: 28.63 KG/M2

## 2020-01-02 DIAGNOSIS — R07.9: ICD-10-CM

## 2020-01-02 DIAGNOSIS — R10.13: Primary | ICD-10-CM

## 2020-01-02 DIAGNOSIS — K21.9: ICD-10-CM

## 2020-01-02 LAB
ALBUMIN SERPL-MCNC: 4 G/DL (ref 3.4–5)
ALP SERPL-CCNC: 82 U/L (ref 45–117)
ALT SERPL-CCNC: 34 U/L (ref 12–78)
ANION GAP SERPL CALC-SCNC: 9 MMOL/L (ref 5–15)
BASOPHILS # BLD AUTO: 0.02 X10^3/UL (ref 0–0.1)
BASOPHILS NFR BLD AUTO: 0 % (ref 0–1)
BILIRUB SERPL-MCNC: 0.6 MG/DL (ref 0.2–1)
CALCIUM SERPL-MCNC: 9 MG/DL (ref 8.5–10.1)
CHLORIDE SERPL-SCNC: 106 MMOL/L (ref 98–107)
CREAT SERPL-MCNC: 1.29 MG/DL (ref 0.7–1.3)
EOSINOPHIL # BLD AUTO: 0.06 X10^3/UL (ref 0–0.4)
EOSINOPHIL NFR BLD AUTO: 1 % (ref 1–7)
ERYTHROCYTE [DISTWIDTH] IN BLOOD BY AUTOMATED COUNT: 13.9 % (ref 9.4–14.8)
LYMPHOCYTES # BLD AUTO: 1.34 X10^3/UL (ref 1–3.4)
LYMPHOCYTES NFR BLD AUTO: 16 % (ref 22–44)
MCH RBC QN AUTO: 30.6 PG (ref 27.5–34.5)
MCHC RBC AUTO-ENTMCNC: 33.8 G/DL (ref 33.2–36.2)
MCV RBC AUTO: 90.7 FL (ref 81–97)
MD: NO
MONOCYTES # BLD AUTO: 0.61 X10^3/UL (ref 0.2–0.8)
MONOCYTES NFR BLD AUTO: 7 % (ref 2–9)
NEUTROPHILS # BLD AUTO: 6.2 X10^3/UL (ref 1.8–6.8)
NEUTROPHILS NFR BLD AUTO: 75 % (ref 42–75)
PLATELET # BLD AUTO: 177 X10^3/UL (ref 130–400)
PMV BLD AUTO: 7.8 FL (ref 7.4–10.4)
PROT SERPL-MCNC: 7.1 G/DL (ref 6.4–8.2)
RBC # BLD AUTO: 5.51 X10^6/UL (ref 4.38–5.82)
TROPONIN I SERPL-MCNC: < 0.015 NG/ML (ref 0–0.04)

## 2020-01-02 PROCEDURE — 85025 COMPLETE CBC W/AUTO DIFF WBC: CPT

## 2020-01-02 PROCEDURE — 84484 ASSAY OF TROPONIN QUANT: CPT

## 2020-01-02 PROCEDURE — 93005 ELECTROCARDIOGRAM TRACING: CPT

## 2020-01-02 PROCEDURE — 99284 EMERGENCY DEPT VISIT MOD MDM: CPT

## 2020-01-02 PROCEDURE — 80053 COMPREHEN METABOLIC PANEL: CPT

## 2020-01-02 PROCEDURE — 36415 COLL VENOUS BLD VENIPUNCTURE: CPT

## 2020-01-02 PROCEDURE — 71045 X-RAY EXAM CHEST 1 VIEW: CPT

## 2020-01-02 PROCEDURE — 83690 ASSAY OF LIPASE: CPT

## 2020-01-02 NOTE — NUR
59Y M BIB EMS FROM HOME, FOR ABD PAIN RADIATING TO CHEST STARTING 3HRS AGO, PT 
GOT 2MG OF MORPHINE PTA. HX OF GERD. PT STS HE WAS AT WORK AND HAD INTENSE ABD 
PAIN, WENT HOME AND PAIN STARTED TO RADIATE TO STERNUM. PT DENIES CARDIAC HX. 
PT CONNECTED TO ALL MONITORING, NADN, WIFE AT BEDSIDE CALL LIGHT IN REACH.

## 2020-01-03 ENCOUNTER — HOSPITAL ENCOUNTER (EMERGENCY)
Dept: HOSPITAL 8 - ED | Age: 60
Discharge: HOME | End: 2020-01-03
Payer: COMMERCIAL

## 2020-01-03 VITALS — SYSTOLIC BLOOD PRESSURE: 124 MMHG | DIASTOLIC BLOOD PRESSURE: 86 MMHG

## 2020-01-03 VITALS — HEIGHT: 77 IN | BODY MASS INDEX: 28.11 KG/M2 | WEIGHT: 238.1 LBS

## 2020-01-03 VITALS — DIASTOLIC BLOOD PRESSURE: 90 MMHG | SYSTOLIC BLOOD PRESSURE: 137 MMHG

## 2020-01-03 DIAGNOSIS — K83.9: Primary | ICD-10-CM

## 2020-01-03 DIAGNOSIS — R16.0: ICD-10-CM

## 2020-01-03 DIAGNOSIS — R10.11: ICD-10-CM

## 2020-01-03 DIAGNOSIS — K21.9: ICD-10-CM

## 2020-01-03 DIAGNOSIS — K76.0: ICD-10-CM

## 2020-01-03 DIAGNOSIS — R10.13: ICD-10-CM

## 2020-01-03 DIAGNOSIS — E07.9: ICD-10-CM

## 2020-01-03 LAB
ALBUMIN SERPL-MCNC: 4.1 G/DL (ref 3.4–5)
ALP SERPL-CCNC: 76 U/L (ref 45–117)
ALT SERPL-CCNC: 35 U/L (ref 12–78)
ANION GAP SERPL CALC-SCNC: 7 MMOL/L (ref 5–15)
BASOPHILS # BLD AUTO: 0 X10^3/UL (ref 0–0.1)
BASOPHILS NFR BLD AUTO: 0 % (ref 0–1)
BILIRUB SERPL-MCNC: 1 MG/DL (ref 0.2–1)
CALCIUM SERPL-MCNC: 8.9 MG/DL (ref 8.5–10.1)
CHLORIDE SERPL-SCNC: 106 MMOL/L (ref 98–107)
CREAT SERPL-MCNC: 1.16 MG/DL (ref 0.7–1.3)
CULTURE INDICATED?: NO
EOSINOPHIL # BLD AUTO: 0 X10^3/UL (ref 0–0.4)
EOSINOPHIL NFR BLD AUTO: 0 % (ref 1–7)
ERYTHROCYTE [DISTWIDTH] IN BLOOD BY AUTOMATED COUNT: 13.7 % (ref 9.4–14.8)
LYMPHOCYTES # BLD AUTO: 0.62 X10^3/UL (ref 1–3.4)
LYMPHOCYTES NFR BLD AUTO: 4 % (ref 22–44)
MCH RBC QN AUTO: 30.5 PG (ref 27.5–34.5)
MCHC RBC AUTO-ENTMCNC: 33.6 G/DL (ref 33.2–36.2)
MCV RBC AUTO: 90.5 FL (ref 81–97)
MD: (no result)
MICROSCOPIC: (no result)
MONOCYTES # BLD AUTO: 0.8 X10^3/UL (ref 0.2–0.8)
MONOCYTES NFR BLD AUTO: 6 % (ref 2–9)
NEUTROPHILS # BLD AUTO: 13.15 X10^3/UL (ref 1.8–6.8)
NEUTROPHILS NFR BLD AUTO: 90 % (ref 42–75)
PLATELET # BLD AUTO: 160 X10^3/UL (ref 130–400)
PMV BLD AUTO: 8.2 FL (ref 7.4–10.4)
PROT SERPL-MCNC: 7.2 G/DL (ref 6.4–8.2)
RBC # BLD AUTO: 5.63 X10^6/UL (ref 4.38–5.82)

## 2020-01-03 PROCEDURE — 93005 ELECTROCARDIOGRAM TRACING: CPT

## 2020-01-03 PROCEDURE — 96372 THER/PROPH/DIAG INJ SC/IM: CPT

## 2020-01-03 PROCEDURE — 99284 EMERGENCY DEPT VISIT MOD MDM: CPT

## 2020-01-03 PROCEDURE — 85025 COMPLETE CBC W/AUTO DIFF WBC: CPT

## 2020-01-03 PROCEDURE — 80053 COMPREHEN METABOLIC PANEL: CPT

## 2020-01-03 PROCEDURE — 81003 URINALYSIS AUTO W/O SCOPE: CPT

## 2020-01-03 PROCEDURE — 36415 COLL VENOUS BLD VENIPUNCTURE: CPT

## 2020-01-03 PROCEDURE — 76700 US EXAM ABDOM COMPLETE: CPT

## 2020-01-03 PROCEDURE — 83690 ASSAY OF LIPASE: CPT

## 2020-01-03 NOTE — NUR
Patient states he came in for abd pain last night. He got a GI cocktail which 
helped him. However when he went home, he vomited once and then the pain came 
back. Patient denies N/V/D. Denies fevers.

## 2020-01-10 ENCOUNTER — HOSPITAL ENCOUNTER (INPATIENT)
Dept: HOSPITAL 8 - OUT | Age: 60
LOS: 3 days | Discharge: HOME | DRG: 414 | End: 2020-01-13
Attending: SURGERY | Admitting: SURGERY
Payer: COMMERCIAL

## 2020-01-10 VITALS — DIASTOLIC BLOOD PRESSURE: 74 MMHG | SYSTOLIC BLOOD PRESSURE: 104 MMHG

## 2020-01-10 VITALS — WEIGHT: 227.08 LBS | BODY MASS INDEX: 26.81 KG/M2 | HEIGHT: 77 IN

## 2020-01-10 VITALS — SYSTOLIC BLOOD PRESSURE: 104 MMHG | DIASTOLIC BLOOD PRESSURE: 67 MMHG

## 2020-01-10 VITALS — SYSTOLIC BLOOD PRESSURE: 124 MMHG | DIASTOLIC BLOOD PRESSURE: 78 MMHG

## 2020-01-10 DIAGNOSIS — K82.A1: ICD-10-CM

## 2020-01-10 DIAGNOSIS — E78.5: ICD-10-CM

## 2020-01-10 DIAGNOSIS — K21.9: ICD-10-CM

## 2020-01-10 DIAGNOSIS — Z53.31: ICD-10-CM

## 2020-01-10 DIAGNOSIS — E03.9: ICD-10-CM

## 2020-01-10 DIAGNOSIS — K81.0: Primary | ICD-10-CM

## 2020-01-10 DIAGNOSIS — K75.0: ICD-10-CM

## 2020-01-10 PROCEDURE — 82248 BILIRUBIN DIRECT: CPT

## 2020-01-10 PROCEDURE — 83690 ASSAY OF LIPASE: CPT

## 2020-01-10 PROCEDURE — 36415 COLL VENOUS BLD VENIPUNCTURE: CPT

## 2020-01-10 PROCEDURE — 0FJ44ZZ INSPECTION OF GALLBLADDER, PERCUTANEOUS ENDOSCOPIC APPROACH: ICD-10-PCS | Performed by: SURGERY

## 2020-01-10 PROCEDURE — 85025 COMPLETE CBC W/AUTO DIFF WBC: CPT

## 2020-01-10 PROCEDURE — C1729 CATH, DRAINAGE: HCPCS

## 2020-01-10 PROCEDURE — 88304 TISSUE EXAM BY PATHOLOGIST: CPT

## 2020-01-10 PROCEDURE — 0FT40ZZ RESECTION OF GALLBLADDER, OPEN APPROACH: ICD-10-PCS | Performed by: SURGERY

## 2020-01-10 PROCEDURE — 80053 COMPREHEN METABOLIC PANEL: CPT

## 2020-01-10 RX ADMIN — SODIUM CHLORIDE SCH MLS/HR: 0.9 INJECTION, SOLUTION INTRAVENOUS at 13:57

## 2020-01-10 RX ADMIN — SODIUM CHLORIDE SCH MLS/HR: 0.9 INJECTION, SOLUTION INTRAVENOUS at 21:35

## 2020-01-10 RX ADMIN — FENTANYL CITRATE PRN MCG: 50 INJECTION INTRAMUSCULAR; INTRAVENOUS at 12:13

## 2020-01-10 RX ADMIN — TAZOBACTAM SODIUM AND PIPERACILLIN SODIUM SCH MLS/HR: 375; 3 INJECTION, SOLUTION INTRAVENOUS at 19:35

## 2020-01-10 RX ADMIN — FENTANYL CITRATE PRN MCG: 50 INJECTION INTRAMUSCULAR; INTRAVENOUS at 12:08

## 2020-01-10 RX ADMIN — TAZOBACTAM SODIUM AND PIPERACILLIN SODIUM SCH MLS/HR: 375; 3 INJECTION, SOLUTION INTRAVENOUS at 14:15

## 2020-01-11 VITALS — DIASTOLIC BLOOD PRESSURE: 69 MMHG | SYSTOLIC BLOOD PRESSURE: 107 MMHG

## 2020-01-11 VITALS — DIASTOLIC BLOOD PRESSURE: 77 MMHG | SYSTOLIC BLOOD PRESSURE: 115 MMHG

## 2020-01-11 VITALS — SYSTOLIC BLOOD PRESSURE: 107 MMHG | DIASTOLIC BLOOD PRESSURE: 73 MMHG

## 2020-01-11 VITALS — SYSTOLIC BLOOD PRESSURE: 93 MMHG | DIASTOLIC BLOOD PRESSURE: 59 MMHG

## 2020-01-11 VITALS — DIASTOLIC BLOOD PRESSURE: 76 MMHG | SYSTOLIC BLOOD PRESSURE: 104 MMHG

## 2020-01-11 LAB
ALBUMIN SERPL-MCNC: 1.9 G/DL (ref 3.4–5)
ALP SERPL-CCNC: 101 U/L (ref 45–117)
ALT SERPL-CCNC: 40 U/L (ref 12–78)
ANION GAP SERPL CALC-SCNC: 10 MMOL/L (ref 5–15)
BASOPHILS # BLD AUTO: 0 X10^3/UL (ref 0–0.1)
BASOPHILS NFR BLD AUTO: 0 % (ref 0–1)
BILIRUB SERPL-MCNC: 1.2 MG/DL (ref 0.2–1)
CALCIUM SERPL-MCNC: 7 MG/DL (ref 8.5–10.1)
CHLORIDE SERPL-SCNC: 99 MMOL/L (ref 98–107)
CREAT SERPL-MCNC: 1.07 MG/DL (ref 0.7–1.3)
EOSINOPHIL # BLD AUTO: 0.14 X10^3/UL (ref 0–0.4)
EOSINOPHIL NFR BLD AUTO: 1 % (ref 1–7)
ERYTHROCYTE [DISTWIDTH] IN BLOOD BY AUTOMATED COUNT: 14.6 % (ref 9.4–14.8)
LYMPHOCYTES # BLD AUTO: 0.84 X10^3/UL (ref 1–3.4)
LYMPHOCYTES NFR BLD AUTO: 6 % (ref 22–44)
MCH RBC QN AUTO: 30.3 PG (ref 27.5–34.5)
MCHC RBC AUTO-ENTMCNC: 33.3 G/DL (ref 33.2–36.2)
MCV RBC AUTO: 91.2 FL (ref 81–97)
MD: NO
MONOCYTES # BLD AUTO: 1.24 X10^3/UL (ref 0.2–0.8)
MONOCYTES NFR BLD AUTO: 9 % (ref 2–9)
NEUTROPHILS # BLD AUTO: 12.13 X10^3/UL (ref 1.8–6.8)
NEUTROPHILS NFR BLD AUTO: 85 % (ref 42–75)
PLATELET # BLD AUTO: 262 X10^3/UL (ref 130–400)
PMV BLD AUTO: 7.8 FL (ref 7.4–10.4)
PROT SERPL-MCNC: 5.6 G/DL (ref 6.4–8.2)
RBC # BLD AUTO: 4.87 X10^6/UL (ref 4.38–5.82)

## 2020-01-11 RX ADMIN — OMEPRAZOLE SCH MG: 20 CAPSULE, DELAYED RELEASE ORAL at 06:30

## 2020-01-11 RX ADMIN — SODIUM CHLORIDE SCH MLS/HR: 0.9 INJECTION, SOLUTION INTRAVENOUS at 06:29

## 2020-01-11 RX ADMIN — LEVOTHYROXINE SODIUM SCH MCG: 75 TABLET ORAL at 06:30

## 2020-01-11 RX ADMIN — TAZOBACTAM SODIUM AND PIPERACILLIN SODIUM SCH MLS/HR: 375; 3 INJECTION, SOLUTION INTRAVENOUS at 14:17

## 2020-01-11 RX ADMIN — TAZOBACTAM SODIUM AND PIPERACILLIN SODIUM SCH MLS/HR: 375; 3 INJECTION, SOLUTION INTRAVENOUS at 21:23

## 2020-01-11 RX ADMIN — TAZOBACTAM SODIUM AND PIPERACILLIN SODIUM SCH MLS/HR: 375; 3 INJECTION, SOLUTION INTRAVENOUS at 08:02

## 2020-01-11 RX ADMIN — ENOXAPARIN SODIUM SCH MG: 30 INJECTION SUBCUTANEOUS at 11:40

## 2020-01-11 RX ADMIN — ENOXAPARIN SODIUM SCH MG: 30 INJECTION SUBCUTANEOUS at 23:37

## 2020-01-11 RX ADMIN — SODIUM CHLORIDE SCH MLS/HR: 0.9 INJECTION, SOLUTION INTRAVENOUS at 14:17

## 2020-01-11 RX ADMIN — TAZOBACTAM SODIUM AND PIPERACILLIN SODIUM SCH MLS/HR: 375; 3 INJECTION, SOLUTION INTRAVENOUS at 01:51

## 2020-01-12 VITALS — SYSTOLIC BLOOD PRESSURE: 109 MMHG | DIASTOLIC BLOOD PRESSURE: 70 MMHG

## 2020-01-12 VITALS — SYSTOLIC BLOOD PRESSURE: 106 MMHG | DIASTOLIC BLOOD PRESSURE: 69 MMHG

## 2020-01-12 VITALS — SYSTOLIC BLOOD PRESSURE: 127 MMHG | DIASTOLIC BLOOD PRESSURE: 76 MMHG

## 2020-01-12 VITALS — DIASTOLIC BLOOD PRESSURE: 72 MMHG | SYSTOLIC BLOOD PRESSURE: 110 MMHG

## 2020-01-12 LAB
ALBUMIN SERPL-MCNC: 1.8 G/DL (ref 3.4–5)
ALP SERPL-CCNC: 79 U/L (ref 45–117)
ALT SERPL-CCNC: 28 U/L (ref 12–78)
ANION GAP SERPL CALC-SCNC: 6 MMOL/L (ref 5–15)
BASOPHILS # BLD AUTO: 0.03 X10^3/UL (ref 0–0.1)
BASOPHILS NFR BLD AUTO: 0 % (ref 0–1)
BILIRUB SERPL-MCNC: 1 MG/DL (ref 0.2–1)
CALCIUM SERPL-MCNC: 7.3 MG/DL (ref 8.5–10.1)
CHLORIDE SERPL-SCNC: 100 MMOL/L (ref 98–107)
CREAT SERPL-MCNC: 0.98 MG/DL (ref 0.7–1.3)
EOSINOPHIL # BLD AUTO: 0.19 X10^3/UL (ref 0–0.4)
EOSINOPHIL NFR BLD AUTO: 2 % (ref 1–7)
ERYTHROCYTE [DISTWIDTH] IN BLOOD BY AUTOMATED COUNT: 14.9 % (ref 9.4–14.8)
LYMPHOCYTES # BLD AUTO: 0.93 X10^3/UL (ref 1–3.4)
LYMPHOCYTES NFR BLD AUTO: 9 % (ref 22–44)
MCH RBC QN AUTO: 30.3 PG (ref 27.5–34.5)
MCHC RBC AUTO-ENTMCNC: 33.4 G/DL (ref 33.2–36.2)
MCV RBC AUTO: 90.7 FL (ref 81–97)
MD: NO
MONOCYTES # BLD AUTO: 1.07 X10^3/UL (ref 0.2–0.8)
MONOCYTES NFR BLD AUTO: 10 % (ref 2–9)
NEUTROPHILS # BLD AUTO: 8.17 X10^3/UL (ref 1.8–6.8)
NEUTROPHILS NFR BLD AUTO: 79 % (ref 42–75)
PLATELET # BLD AUTO: 286 X10^3/UL (ref 130–400)
PMV BLD AUTO: 7.5 FL (ref 7.4–10.4)
PROT SERPL-MCNC: 5.4 G/DL (ref 6.4–8.2)
RBC # BLD AUTO: 4.37 X10^6/UL (ref 4.38–5.82)

## 2020-01-12 RX ADMIN — OXYCODONE HYDROCHLORIDE AND ACETAMINOPHEN PRN TAB: 5; 325 TABLET ORAL at 10:50

## 2020-01-12 RX ADMIN — SODIUM CHLORIDE SCH MLS/HR: 0.9 INJECTION, SOLUTION INTRAVENOUS at 02:55

## 2020-01-12 RX ADMIN — OXYCODONE HYDROCHLORIDE AND ACETAMINOPHEN PRN TAB: 5; 325 TABLET ORAL at 22:22

## 2020-01-12 RX ADMIN — SODIUM CHLORIDE, PRESERVATIVE FREE SCH ML: 5 INJECTION INTRAVENOUS at 09:00

## 2020-01-12 RX ADMIN — ENOXAPARIN SODIUM SCH MG: 30 INJECTION SUBCUTANEOUS at 10:50

## 2020-01-12 RX ADMIN — TAZOBACTAM SODIUM AND PIPERACILLIN SODIUM SCH MLS/HR: 375; 3 INJECTION, SOLUTION INTRAVENOUS at 14:11

## 2020-01-12 RX ADMIN — ENOXAPARIN SODIUM SCH MG: 30 INJECTION SUBCUTANEOUS at 22:22

## 2020-01-12 RX ADMIN — TAZOBACTAM SODIUM AND PIPERACILLIN SODIUM SCH MLS/HR: 375; 3 INJECTION, SOLUTION INTRAVENOUS at 08:55

## 2020-01-12 RX ADMIN — OMEPRAZOLE SCH MG: 20 CAPSULE, DELAYED RELEASE ORAL at 06:39

## 2020-01-12 RX ADMIN — TAZOBACTAM SODIUM AND PIPERACILLIN SODIUM SCH MLS/HR: 375; 3 INJECTION, SOLUTION INTRAVENOUS at 02:56

## 2020-01-12 RX ADMIN — SODIUM CHLORIDE, PRESERVATIVE FREE SCH ML: 5 INJECTION INTRAVENOUS at 20:27

## 2020-01-12 RX ADMIN — OXYCODONE HYDROCHLORIDE AND ACETAMINOPHEN PRN TAB: 5; 325 TABLET ORAL at 17:51

## 2020-01-12 RX ADMIN — LEVOTHYROXINE SODIUM SCH MCG: 75 TABLET ORAL at 06:38

## 2020-01-12 RX ADMIN — TAZOBACTAM SODIUM AND PIPERACILLIN SODIUM SCH MLS/HR: 375; 3 INJECTION, SOLUTION INTRAVENOUS at 20:27

## 2020-01-13 VITALS — DIASTOLIC BLOOD PRESSURE: 78 MMHG | SYSTOLIC BLOOD PRESSURE: 118 MMHG

## 2020-01-13 VITALS — SYSTOLIC BLOOD PRESSURE: 113 MMHG | DIASTOLIC BLOOD PRESSURE: 63 MMHG

## 2020-01-13 RX ADMIN — LEVOTHYROXINE SODIUM SCH MCG: 75 TABLET ORAL at 05:57

## 2020-01-13 RX ADMIN — SODIUM CHLORIDE, PRESERVATIVE FREE SCH ML: 5 INJECTION INTRAVENOUS at 07:39

## 2020-01-13 RX ADMIN — TAZOBACTAM SODIUM AND PIPERACILLIN SODIUM SCH MLS/HR: 375; 3 INJECTION, SOLUTION INTRAVENOUS at 01:32

## 2020-01-13 RX ADMIN — TAZOBACTAM SODIUM AND PIPERACILLIN SODIUM SCH MLS/HR: 375; 3 INJECTION, SOLUTION INTRAVENOUS at 07:38

## 2020-01-13 RX ADMIN — OMEPRAZOLE SCH MG: 20 CAPSULE, DELAYED RELEASE ORAL at 05:56

## 2020-01-13 RX ADMIN — OXYCODONE HYDROCHLORIDE AND ACETAMINOPHEN PRN TAB: 5; 325 TABLET ORAL at 08:06

## 2020-04-28 NOTE — PROGRESS NOTES
CC:  Follow-up for ROB on AutoPap 5 to 15 cm H2O     HPI:  Mr. Jagdish Magdaleno is a 58-year-old male first seen on 5/18/2018 for snoring and excessive daytime somnolence.  His home sleep study was performed on 6/7/2018 and showed an AHI of 56, a cj saturation of 79%, and saturations were less than 88% for 4.5 hours.  He was empirically placed on auto titrating CPAP 5 to 15 cm H2O.  When last seen, 10/29/2019, his compliance was excellent though he had an elevated AHI at 9.9.    Today, 4/29/20, his 30 day compliance is 100% for 5 hours and 44 minutes.  He has residual average apnea hypopnea index of 12.2.  His auto CPAP mean pressure 7.1 and he remains on AutoPap 5-15 cm water.  His auto CPAP peak average pressure is 1.3 cm water.  He likely has some's central events which are not adequately treated with the auto titrating CPAP but since his doing well and had a baseline AHI of 56 we will make no changes unless he worsens.     The patient reports improved symptoms using positive airway pressure.  Has experienced no significant problems with mask fit, mask leak, pressure tolerance, excessive airway dryness, nasal congestion, aerophagia, or chest pain. Has worked through the [employer] finally.     10/29/2019 he had an overnight oximetry showing that his saturations were less than 90% for only 2.6% of the recording with a cj saturation of 87%.  Therefore no changes were made in therapy.    Significant modifying factors and comorbidities include non-smoker, hypothyroidism, GERD, dyslipidemia, and a  history of polycythemia followed by Dr. Petersen.      Patient Active Problem List    Diagnosis Date Noted   • Need for influenza vaccination 10/29/2019   • Dyslipidemia 05/23/2019   • Gastroesophageal reflux 05/23/2019   • Other specified hypothyroidism 05/23/2019   • Polycythemia 05/23/2019   • Obstructive sleep apnea 11/05/2018   • Non-smoker 11/05/2018       Past Medical History:   Diagnosis Date   • Sleep apnea          History reviewed. No pertinent surgical history.    Family History   Problem Relation Age of Onset   • Lung Cancer Mother    • Heart Attack Father    • Sleep Apnea Neg Hx        Social History     Socioeconomic History   • Marital status:      Spouse name: Not on file   • Number of children: Not on file   • Years of education: Not on file   • Highest education level: Not on file   Occupational History   • Not on file   Social Needs   • Financial resource strain: Not on file   • Food insecurity     Worry: Not on file     Inability: Not on file   • Transportation needs     Medical: Not on file     Non-medical: Not on file   Tobacco Use   • Smoking status: Never Smoker   • Smokeless tobacco: Never Used   Substance and Sexual Activity   • Alcohol use: Yes     Alcohol/week: 1.8 oz     Types: 3 Cans of beer per week   • Drug use: No   • Sexual activity: Not on file   Lifestyle   • Physical activity     Days per week: Not on file     Minutes per session: Not on file   • Stress: Not on file   Relationships   • Social connections     Talks on phone: Not on file     Gets together: Not on file     Attends [employer] service: Not on file     Active member of club or organization: Not on file     Attends meetings of clubs or organizations: Not on file     Relationship status: Not on file   • Intimate partner violence     Fear of current or ex partner: Not on file     Emotionally abused: Not on file     Physically abused: Not on file     Forced sexual activity: Not on file   Other Topics Concern   • Not on file   Social History Narrative   • Not on file       Current Outpatient Medications   Medication Sig Dispense Refill   • LEVOXYL 175 MCG Tab Take 175 mcg by mouth every day.  1   • omeprazole (PRILOSEC) 40 MG delayed-release capsule Take 40 mg by mouth every day.  0   • simvastatin (ZOCOR) 40 MG Tab Take 40 mg by mouth every day.  5   • Cholecalciferol (VITAMIN D PO) Take  by mouth.       No current  "facility-administered medications for this visit.     \"CURRENT RX\"    ALLERGIES: Patient has no known allergies.    ROS    Constitutional: Denies fever, chills, sweats,  weight loss, fatigue  Cardiovascular: Denies chest pain, tightness, palpitations, swelling in legs/feet, fainting, difficulty breathing when lying down but gets better when sitting up.   Respiratory: Denies shortness of breath, cough, sputum, wheezing, painful breathing, coughing up blood.   Sleep: per HPI  Gastrointestinal: He had a cholecystectomy performed in January and was found to have liver abscesses.  Consequently he was unable to use his machine for about a month because of pain and discomfort.  Musculoskeletal: Denies painful joints, sore muscles, back pain.        PHYSICAL EXAM  Excellent weight    /88 (BP Location: Right arm, Patient Position: Sitting, BP Cuff Size: Adult)   Pulse 74   Resp 16   Ht 1.956 m (6' 5\")   Wt 98 kg (216 lb)   SpO2 97%   BMI 25.61 kg/m²   Appearance: Well-nourished, well-developed, no acute distress  Eyes:  PERRLA, EOMI  ENMT: without lesions, deformities;hearing grossly intact; tongue normal, posterior pharynx without erythema or exudate;   Modified Mallampati (AKA Freidman) Score: Has a mask on secondary to the coronal pandemic neck: Supple, trachea midline, no masses  Respiratory effort:  No intercostal retractions or use of accessory muscles  Lung auscultation:  No wheezes rhonchi rubs or rales  Cardiac: No murmurs, rubs, or gallops; regular rhythm, normal rate; no edema  Abdomen:  No tenderness, no organomegaly  Musculoskeletal:  Grossly normal; gait and station normal; digits and nails normal  Skin:  No rashes, petechiae, cyanosis  Neurologic: without focal signs; oriented to person, time, place, and purpose; judgement intact  Psychiatric:  No depression, anxiety, agitation        PROBLEMS:  1. Obstructive sleep apnea    - DME Mask and Supplies    2. Non-smoker      3. Polycythemia      4. " Other specified hypothyroidism      5. Gastroesophageal reflux disease, esophagitis presence not specified      6. Dyslipidemia        PLAN:     Has been advised to continue the current AutoPap 5 to 15 cm water, clean equipment frequently, and get new mask and supplies as allowed by insurance and DME. Advised about potential problems including nasal obstruction/stuffiness, mask leak or discomfort , frequent awakenings, chill or dryness of the upper airway, noise, inconvenience, and lack of benefit. Recommend an earlier appointment, if significant treatment barriers develop.    Have advised the patient to follow up with the appropriate healthcare practitioners for all other medical problems and issues.    Return in about 1 year (around 4/29/2021).

## 2020-04-29 ENCOUNTER — SLEEP CENTER VISIT (OUTPATIENT)
Dept: SLEEP MEDICINE | Facility: MEDICAL CENTER | Age: 60
End: 2020-04-29
Payer: COMMERCIAL

## 2020-04-29 VITALS
RESPIRATION RATE: 16 BRPM | HEART RATE: 74 BPM | WEIGHT: 216 LBS | SYSTOLIC BLOOD PRESSURE: 124 MMHG | HEIGHT: 77 IN | OXYGEN SATURATION: 97 % | DIASTOLIC BLOOD PRESSURE: 88 MMHG | BODY MASS INDEX: 25.5 KG/M2

## 2020-04-29 DIAGNOSIS — G47.33 OBSTRUCTIVE SLEEP APNEA: ICD-10-CM

## 2020-04-29 DIAGNOSIS — K21.9 GASTROESOPHAGEAL REFLUX DISEASE, ESOPHAGITIS PRESENCE NOT SPECIFIED: ICD-10-CM

## 2020-04-29 DIAGNOSIS — E78.5 DYSLIPIDEMIA: ICD-10-CM

## 2020-04-29 DIAGNOSIS — D75.1 POLYCYTHEMIA: ICD-10-CM

## 2020-04-29 DIAGNOSIS — Z78.9 NON-SMOKER: ICD-10-CM

## 2020-04-29 DIAGNOSIS — E03.8 OTHER SPECIFIED HYPOTHYROIDISM: ICD-10-CM

## 2020-04-29 PROCEDURE — 99214 OFFICE O/P EST MOD 30 MIN: CPT | Performed by: INTERNAL MEDICINE

## 2021-01-13 ENCOUNTER — HOSPITAL ENCOUNTER (OUTPATIENT)
Dept: HOSPITAL 8 - CVU | Age: 61
Discharge: HOME | End: 2021-01-13
Attending: INTERNAL MEDICINE
Payer: COMMERCIAL

## 2021-01-13 DIAGNOSIS — R00.2: Primary | ICD-10-CM

## 2021-01-13 PROCEDURE — 93356 MYOCRD STRAIN IMG SPCKL TRCK: CPT

## 2021-01-13 PROCEDURE — 93306 TTE W/DOPPLER COMPLETE: CPT

## 2021-01-29 ENCOUNTER — HOSPITAL ENCOUNTER (OUTPATIENT)
Dept: HOSPITAL 8 - CVU | Age: 61
Discharge: HOME | End: 2021-01-29
Attending: NURSE PRACTITIONER
Payer: COMMERCIAL

## 2021-01-29 DIAGNOSIS — M79.604: Primary | ICD-10-CM

## 2021-01-29 DIAGNOSIS — E78.5: ICD-10-CM

## 2021-01-29 PROCEDURE — 93922 UPR/L XTREMITY ART 2 LEVELS: CPT

## 2021-03-15 DIAGNOSIS — Z23 NEED FOR VACCINATION: ICD-10-CM

## 2021-04-22 NOTE — PROGRESS NOTES
CC: Follow-up for ROB on AutoPap 5 to 15 cm H2O     HPI:   Mr. Jagdish Magdaleno is a 58-year-old male first seen on 5/18/2018 for snoring and excessive daytime somnolence. His home sleep study was performed on 6/7/2018 and showed an AHI of 56, a cj saturation of 79%, and saturations  less than 88% for 4.5 hours. He was empirically placed on auto titrating CPAP 5 to 15 cm H2O.  10/29/2019 he had an overnight oximetry showing that his saturations were less than 90% for only 2.6% of the recording with a cj saturation of 87%. Therefore no changes were made in therapy.  When last seen on 4/29/2020 his 30-day compliance is 100% for 5 hours and 34 minutes.  He had a residual average apnea-hypopnea index was 12.2.  Today, 4/23/21, his 30 day compliance is 100% for 5 hours 55 minutes on apap 5-15 cm water. The average residual ahi is 5.3 with a apap mean pressure of 6.3 cm water. Has no  significant leak.  The patient reports improved symptoms using positive airway pressure. Has experienced no significant problems with mask fit, mask leak, pressure tolerance, excessive airway dryness, nasal congestion, aerophagia, or chest pain. Has worked through the [employer] finally.   Significant modifying factors and comorbidities include non-smoker, hypothyroidism, GERD, dyslipidemia, and a history of polycythemia followed by Dr. Petersen.      Patient Active Problem List    Diagnosis Date Noted   • Need for influenza vaccination 10/29/2019   • Dyslipidemia 05/23/2019   • Gastroesophageal reflux 05/23/2019   • Other specified hypothyroidism 05/23/2019   • Polycythemia 05/23/2019   • Obstructive sleep apnea 11/05/2018   • Non-smoker 11/05/2018       Past Medical History:   Diagnosis Date   • Sleep apnea         History reviewed. No pertinent surgical history.    Family History   Problem Relation Age of Onset   • Lung Cancer Mother    • Heart Attack Father    • Sleep Apnea Neg Hx        Social History     Socioeconomic History  "  • Marital status:      Spouse name: Not on file   • Number of children: Not on file   • Years of education: Not on file   • Highest education level: Not on file   Occupational History   • Not on file   Tobacco Use   • Smoking status: Never Smoker   • Smokeless tobacco: Never Used   Substance and Sexual Activity   • Alcohol use: Yes     Alcohol/week: 1.8 oz     Types: 3 Cans of beer per week   • Drug use: No   • Sexual activity: Not on file   Other Topics Concern   • Not on file   Social History Narrative   • Not on file     Social Determinants of Health     Financial Resource Strain:    • Difficulty of Paying Living Expenses:    Food Insecurity:    • Worried About Running Out of Food in the Last Year:    • Ran Out of Food in the Last Year:    Transportation Needs:    • Lack of Transportation (Medical):    • Lack of Transportation (Non-Medical):    Physical Activity:    • Days of Exercise per Week:    • Minutes of Exercise per Session:    Stress:    • Feeling of Stress :    Social Connections:    • Frequency of Communication with Friends and Family:    • Frequency of Social Gatherings with Friends and Family:    • Attends [employer] Services:    • Active Member of Clubs or Organizations:    • Attends Club or Organization Meetings:    • Marital Status:    Intimate Partner Violence:    • Fear of Current or Ex-Partner:    • Emotionally Abused:    • Physically Abused:    • Sexually Abused:        Current Outpatient Medications   Medication Sig Dispense Refill   • LEVOXYL 175 MCG Tab Take 175 mcg by mouth every day.  1   • omeprazole (PRILOSEC) 40 MG delayed-release capsule Take 40 mg by mouth every day.  0   • simvastatin (ZOCOR) 40 MG Tab Take 40 mg by mouth every day.  5   • Cholecalciferol (VITAMIN D PO) Take  by mouth.       No current facility-administered medications for this visit.    \"CURRENT RX\"    ALLERGIES: Patient has no known allergies.    ROS    Constitutional: Denies fever, chills, sweats,  weight " "loss, fatigue  Cardiovascular: followed by cards now, w/u negative   Respiratory: Denies shortness of breath, cough, sputum, wheezing, painful breathing, coughing up blood.   Sleep: per HPI  Gastrointestinal: Denies  difficulty swallowing, nausea, abdominal pain, diarrhea, constipation, heartburn.  Musculoskeletal: Denies painful joints, sore muscles, back pain.        PHYSICAL EXAM    /76 (BP Location: Left arm, Patient Position: Sitting, BP Cuff Size: Large adult)   Pulse 73   Ht 1.956 m (6' 5\")   Wt 94.8 kg (209 lb)   SpO2 96%   BMI 24.78 kg/m²   Appearance: Well-nourished, well-developed, no acute distress  Eyes:  PERRLA, EOMI  ENMT: masked  Neck: Supple, trachea midline, no masses  Respiratory effort:  No intercostal retractions or use of accessory muscles  Lung auscultation:  No wheezes rhonchi rubs or rales  Cardiac: No murmurs, rubs, or gallops; regular rhythm, normal rate; no edema  Abdomen:  No tenderness, no organomegaly.  Musculoskeletal:  Grossly normal; gait and station normal; digits and nails normal  Skin:  No rashes, petechiae, cyanosis  Neurologic: without focal signs; oriented to person, time, place, and purpose; judgement intact  Psychiatric:  No depression, anxiety, agitation      Medical Decision Making       The medical record was reviewed in its entirety including the referral notes, records from primary care, consultants notes, hospital records, lab, imaging, microbiology, immunology, and immunizations.  Care gaps identified and reviewed with the patient.    Diagnostic and titration nocturnal polysomnogram's, home sleep apnea tests, continuous nocturnal oximetry results, multiple sleep latency tests, and compliance reports reviewed.  1. Obstructive sleep apnea  - DME Mask and Supplies    2. Other specified hypothyroidism    3. Polycythemia    4. Non-smoker    5. Gastroesophageal reflux disease, unspecified whether esophagitis present    6. Dyslipidemia   Polycythemia has resolved. " Not seeing Petersen anymore.     PLAN:   Has been advised to continue the current 5-15 cm water, clean equipment frequently, and get new mask and supplies as allowed by insurance and DME. Advised about potential problems including nasal obstruction/stuffiness, mask leak or discomfort , frequent awakenings, chill or dryness of the upper airway, noise, inconvenience, and lack of benefit. Recommend an earlier appointment, if significant treatment barriers develop.    The risks of untreated sleep apnea were discussed with the patient at length. Patients with ROB are at increased risk of cardiovascular disease including coronary artery disease, systemic arterial hypertension, pulmonary arterial hypertension, cardiac arrythmias, and stroke. ROB patients have an increased risk of motor vehicle accidents, type 2 diabetes, chronic kidney disease, and non-alcoholic liver disease. The patient was advised to avoid driving a motor vehicle when drowsy.    Positive airway pressure will favorably impact many of the adverse conditions and effects provoked by ROB.    Have advised the patient to follow up with the appropriate healthcare practitioners for all other medical problems and issues.    Mask wearing, handwashing, and social distancing protocols reviewed and encouraged.    Return in about 1 year (around 4/23/2022).

## 2021-04-23 ENCOUNTER — OFFICE VISIT (OUTPATIENT)
Dept: SLEEP MEDICINE | Facility: MEDICAL CENTER | Age: 61
End: 2021-04-23
Payer: COMMERCIAL

## 2021-04-23 VITALS
BODY MASS INDEX: 24.68 KG/M2 | HEIGHT: 77 IN | OXYGEN SATURATION: 96 % | HEART RATE: 73 BPM | SYSTOLIC BLOOD PRESSURE: 128 MMHG | WEIGHT: 209 LBS | DIASTOLIC BLOOD PRESSURE: 76 MMHG

## 2021-04-23 DIAGNOSIS — E78.5 DYSLIPIDEMIA: ICD-10-CM

## 2021-04-23 DIAGNOSIS — Z78.9 NON-SMOKER: ICD-10-CM

## 2021-04-23 DIAGNOSIS — D75.1 POLYCYTHEMIA: ICD-10-CM

## 2021-04-23 DIAGNOSIS — K21.9 GASTROESOPHAGEAL REFLUX DISEASE, UNSPECIFIED WHETHER ESOPHAGITIS PRESENT: ICD-10-CM

## 2021-04-23 DIAGNOSIS — E03.8 OTHER SPECIFIED HYPOTHYROIDISM: ICD-10-CM

## 2021-04-23 DIAGNOSIS — G47.33 OBSTRUCTIVE SLEEP APNEA: ICD-10-CM

## 2021-04-23 PROCEDURE — 99214 OFFICE O/P EST MOD 30 MIN: CPT | Performed by: INTERNAL MEDICINE

## 2021-05-24 ENCOUNTER — HOSPITAL ENCOUNTER (OUTPATIENT)
Dept: HOSPITAL 8 - CVU | Age: 61
Discharge: HOME | End: 2021-05-24
Attending: NURSE PRACTITIONER
Payer: COMMERCIAL

## 2021-05-24 DIAGNOSIS — I65.21: Primary | ICD-10-CM

## 2021-05-24 DIAGNOSIS — R42: ICD-10-CM

## 2021-05-24 PROCEDURE — 93880 EXTRACRANIAL BILAT STUDY: CPT

## 2021-10-14 ENCOUNTER — TELEPHONE (OUTPATIENT)
Dept: SLEEP MEDICINE | Facility: MEDICAL CENTER | Age: 61
End: 2021-10-14

## 2022-02-21 NOTE — PROGRESS NOTES
CC: Annual visit for ROB on APAP 5-15 cm water.      HPI:  Abner Magdaleno is a 61 y.o. [employer] employee kindly referred by Junito Mc M.D. and last seen 4/23/2021 when his compliance was excellent and his average residual estimated apnea-hypopnea index was a near normal 5.3 on APAP 5 to 15 cm water    His home sleep study was performed on 6/7/2018 and showed an AHI of 56, a cj saturation 79%, and saturations less than 88% for 4.5 hours.    10/29/2019 he had an overnight oximetry showing that his saturations were less than 90% for only 2.6% of the recording with a cj saturation of 87%. Therefore no changes were made in therapy.   Today, 2/23/2022, his 30-day compliance is 93.3% for 5 hours and 10 minutes.  His average residual estimated apnea-hypopnea index is near normal 6.1 on APAP 5-15 m water.    The [employer] recall discussed. Questions answered. Advised to avoid using unapproved cleaning products such as ozone and avoid high heat and humidity environments which may contribute to noise abatement foam degradation.  Advised to register his machine on the [employer] website which she is already done.  He does not want a new machine.  He prefers to wait for his replacement.  The patient reports improved symptoms using positive airway pressure. Has experienced no significant problems with mask fit, mask leak, pressure tolerance, excessive airway dryness, nasal congestion, aerophagia, or chest pain. Has worked through the claustrophobia finally.  Significant modifying factors and comorbidities include care giver stress (wife marlo Parkinson's), prior liver abscess,  overdue for SARS-CoV-2 dose 2, need for Shingrix vaccination, need for updated flu vaccination, non-smoker, hypothyroidism, GERD, dyslipidemia, and a history of polycythemia followed by Dr. Petersen.        Patient Active Problem List    Diagnosis Date Noted   • Need for influenza vaccination 10/29/2019   • Dyslipidemia  05/23/2019   • Gastroesophageal reflux 05/23/2019   • Other specified hypothyroidism 05/23/2019   • Polycythemia 05/23/2019   • Obstructive sleep apnea 11/05/2018   • Non-smoker 11/05/2018       Past Medical History:   Diagnosis Date   • Sleep apnea         History reviewed. No pertinent surgical history.    Family History   Problem Relation Age of Onset   • Lung Cancer Mother    • Heart Attack Father    • Sleep Apnea Neg Hx        Social History     Socioeconomic History   • Marital status:      Spouse name: Not on file   • Number of children: Not on file   • Years of education: Not on file   • Highest education level: Not on file   Occupational History   • Not on file   Tobacco Use   • Smoking status: Never Smoker   • Smokeless tobacco: Never Used   Substance and Sexual Activity   • Alcohol use: Yes     Alcohol/week: 1.8 oz     Types: 3 Cans of beer per week   • Drug use: No   • Sexual activity: Not on file   Other Topics Concern   • Not on file   Social History Narrative   • Not on file     Social Determinants of Health     Financial Resource Strain: Not on file   Food Insecurity: Not on file   Transportation Needs: Not on file   Physical Activity: Not on file   Stress: Not on file   Social Connections: Not on file   Intimate Partner Violence: Not on file   Housing Stability: Not on file       Current Outpatient Medications   Medication Sig Dispense Refill   • levothyroxine (SYNTHROID) 150 MCG Tab Take 150 mcg by mouth every day.     • rosuvastatin (CRESTOR) 5 MG Tab Take 5 mg by mouth every evening.     • omeprazole (PRILOSEC) 40 MG delayed-release capsule Take 40 mg by mouth every day.  0   • Cholecalciferol (VITAMIN D PO) Take  by mouth.     • LEVOXYL 175 MCG Tab Take 175 mcg by mouth every day. (Patient not taking: Reported on 2/23/2022)  1   • simvastatin (ZOCOR) 40 MG Tab Take 40 mg by mouth every day. (Patient not taking: Reported on 2/23/2022)  5     No current facility-administered medications for  "this visit.    \"CURRENT RX\"    ALLERGIES: Patient has no known allergies.    ROS    Constitutional: Denies fever, chills, sweats,  weight loss, fatigue  Cardiovascular: Denies chest pain, tightness, palpitations, swelling in legs/feet, fainting, difficulty breathing when lying down but gets better when sitting up.   Respiratory: Denies shortness of breath, cough, sputum, wheezing, painful breathing, coughing up blood.   Sleep: per HPI  Gastrointestinal: Denies  difficulty swallowing, nausea, abdominal pain, diarrhea, constipation, heartburn.  Musculoskeletal: Denies painful joints, sore muscles, back pain.        PHYSICAL EXAM      /80 (BP Location: Left arm, Patient Position: Sitting, BP Cuff Size: Adult)   Pulse 72   Resp 16   Ht 1.956 m (6' 5\")   Wt 98 kg (216 lb)   SpO2 98%   BMI 25.61 kg/m²   Appearance: Well-nourished, well-developed, no acute distress  Eyes:  PERRLA, EOMI  ENMT: masked  Neck: Supple, trachea midline, no masses  Respiratory effort:  No intercostal retractions or use of accessory muscles  Lung auscultation:  No wheezes rhonchi rubs or rales  Cardiac: No murmurs, rubs, or gallops; regular rhythm, normal rate; no edema  Abdomen:  No tenderness, no organomegaly.  Musculoskeletal:  Grossly normal; gait and station normal; digits and nails normal  Skin:  No rashes, petechiae, cyanosis  Neurologic: without focal signs; oriented to person, time, place, and purpose; judgement intact  Psychiatric:  No depression, anxiety, agitation      Medical Decision Making       The medical record was reviewed in its entirety including the referral notes, records from primary care, consultants notes, hospital records, lab, imaging, microbiology, immunology, and immunizations.  Care gaps identified and reviewed with the patient.    Diagnostic and titration nocturnal polysomnogram's, home sleep apnea tests, continuous nocturnal oximetry results, multiple sleep latency tests, and compliance reports " reviewed.  1. ROB (obstructive sleep apnea)  - DME Mask and Supplies    Other orders  - levothyroxine (SYNTHROID) 150 MCG Tab; Take 150 mcg by mouth every day.  - rosuvastatin (CRESTOR) 5 MG Tab; Take 5 mg by mouth every evening.      PLAN:   Has been advised to continue the current  CPAP, clean equipment frequently, and get new mask and supplies as allowed by insurance and DME. Advised about potential problems including nasal obstruction/stuffiness, mask leak or discomfort , frequent awakenings, chill or dryness of the upper airway, noise, inconvenience, and lack of benefit. Recommend an earlier appointment, if significant treatment barriers develop.    The risks of untreated sleep apnea were discussed with the patient at length. Patients with ROB are at increased risk of cardiovascular disease including coronary artery disease, systemic arterial hypertension, pulmonary arterial hypertension, cardiac arrythmias, and stroke. ROB patients have an increased risk of motor vehicle accidents, type 2 diabetes, chronic kidney disease, and non-alcoholic liver disease. The patient was advised to avoid driving a motor vehicle when drowsy.    Positive airway pressure will favorably impact many of the adverse conditions and effects provoked by ROB.    Have advised the patient to follow up with the appropriate healthcare practitioners for all other medical problems and issues.    N95 mask wearing, handwashing, and social distancing protocols reviewed and encouraged.    Return in about 1 year (around 2/23/2023).        Total time 30 minutes       Hemoglobin better but still high, will check OPO on PAP.

## 2022-02-23 ENCOUNTER — OFFICE VISIT (OUTPATIENT)
Dept: SLEEP MEDICINE | Facility: MEDICAL CENTER | Age: 62
End: 2022-02-23
Payer: COMMERCIAL

## 2022-02-23 VITALS
BODY MASS INDEX: 25.5 KG/M2 | SYSTOLIC BLOOD PRESSURE: 118 MMHG | HEART RATE: 72 BPM | OXYGEN SATURATION: 98 % | HEIGHT: 77 IN | WEIGHT: 216 LBS | RESPIRATION RATE: 16 BRPM | DIASTOLIC BLOOD PRESSURE: 80 MMHG

## 2022-02-23 DIAGNOSIS — G47.33 OSA (OBSTRUCTIVE SLEEP APNEA): ICD-10-CM

## 2022-02-23 PROCEDURE — 99214 OFFICE O/P EST MOD 30 MIN: CPT | Performed by: INTERNAL MEDICINE

## 2022-02-23 RX ORDER — LEVOTHYROXINE SODIUM 0.15 MG/1
150 TABLET ORAL
COMMUNITY
Start: 2022-01-21

## 2022-02-23 RX ORDER — ROSUVASTATIN CALCIUM 5 MG/1
5 TABLET, COATED ORAL EVERY EVENING
COMMUNITY

## 2022-04-08 ENCOUNTER — HOME STUDY (OUTPATIENT)
Dept: SLEEP MEDICINE | Facility: MEDICAL CENTER | Age: 62
End: 2022-04-08
Attending: INTERNAL MEDICINE
Payer: COMMERCIAL

## 2022-04-08 DIAGNOSIS — G47.33 OSA (OBSTRUCTIVE SLEEP APNEA): ICD-10-CM

## 2022-04-09 PROCEDURE — 94762 N-INVAS EAR/PLS OXIMTRY CONT: CPT | Performed by: STUDENT IN AN ORGANIZED HEALTH CARE EDUCATION/TRAINING PROGRAM

## 2022-04-12 NOTE — PROCEDURES
Overnight Pulse Oximetry    Interpretation:    This overnight oximetry was recorded on 4/9/2022 with the patient on CPAP 5-15cm .  The analysis duration was 7hrs 16min. 51 total oximetric events occurred encompassing 28.3 minutes .  The average event duration was  33.3 seconds .  The saturations were less than 90% for 9.7% of the recording.  Basal oxygen saturation of 91%. The cj saturation was 83% .  The patient spent 2.1 minutes with saturations < 88%.  Overall, this continuous nocturnal oximetry demonstrates unremarkable study while on CPAP 5-15cm.    Recommendation:  Continue current management.   Clinical correlation is needed.

## 2022-04-28 ENCOUNTER — TELEPHONE (OUTPATIENT)
Dept: SLEEP MEDICINE | Facility: MEDICAL CENTER | Age: 62
End: 2022-04-28
Payer: COMMERCIAL

## 2022-04-28 NOTE — TELEPHONE ENCOUNTER
VOICEMAIL  1. Caller Name: Abner Magdaleno  [phone] (home)     2. Message: Voicemail received on 4/27/22 asking if he requires oxygen or not.  Also asking if he needs to schedule an appt with your office?

## 2023-01-30 NOTE — PROGRESS NOTES
CC: Annual visit for        HPI:  Abner Magdaleno is a 62 y.o.male works for [employer] kindly referred by Junito Mc M.D. and last seen on 2/23/2022 when his compliance was excellent and his AHI near normal at 6.1 on APAP 5 to 15 cm water.    His home sleep study performed in 6/7/2018 showed an AHI of 56, a cj saturation 79%, and saturations less than 88% for 4.5 hours.    Since he still had an elevated hemoglobin we performed an overnight oximetry on PAP which showed saturations in general were satisfactory and no interventions were needed.    Today, 1/31/2023, his 30-day compliance is 100% for 6 hours and 11 minutes.  He has an average residual estimated apnea-hypopnea index slightly elevated at 7.8 on auto titrating CPAP 5-15 cm water.  Auto CPAP mean pressure is only 6.8 cm water so he may have some residual centrals.    Got his new machine from [employer].    The patient reports improved symptoms using positive airway pressure. Has experienced no significant problems with mask fit, mask leak, pressure tolerance, excessive airway dryness, nasal congestion, aerophagia, or chest pain. Has worked through the [employer].     Significant modifying factors and comorbidities include care giver stress (wife marlo Parkinson's), prior liver abscess, possible immunization care gaps, non-smoker, hypothyroidism, GERD, dyslipidemia, and a history of polycythemia followed by Dr. Petersen.                  Patient Active Problem List    Diagnosis Date Noted    Need for influenza vaccination 10/29/2019    Dyslipidemia 05/23/2019    Gastroesophageal reflux 05/23/2019    Other specified hypothyroidism 05/23/2019    Polycythemia 05/23/2019    Obstructive sleep apnea 11/05/2018    Non-smoker 11/05/2018       Past Medical History:   Diagnosis Date    Sleep apnea         History reviewed. No pertinent surgical history.    Family History   Problem Relation Age of Onset    Lung Cancer Mother     Heart Attack Father      "Sleep Apnea Neg Hx        Social History     Socioeconomic History    Marital status:      Spouse name: Not on file    Number of children: Not on file    Years of education: Not on file    Highest education level: Not on file   Occupational History    Not on file   Tobacco Use    Smoking status: Never    Smokeless tobacco: Never   Vaping Use    Vaping Use: Never used   Substance and Sexual Activity    Alcohol use: Yes     Alcohol/week: 1.8 oz     Types: 3 Cans of beer per week     Comment: occ    Drug use: No    Sexual activity: Not on file   Other Topics Concern    Not on file   Social History Narrative    Not on file     Social Determinants of Health     Financial Resource Strain: Not on file   Food Insecurity: Not on file   Transportation Needs: Not on file   Physical Activity: Not on file   Stress: Not on file   Social Connections: Not on file   Intimate Partner Violence: Not on file   Housing Stability: Not on file       Current Outpatient Medications   Medication Sig Dispense Refill    levothyroxine (SYNTHROID) 150 MCG Tab Take 150 mcg by mouth every day.      rosuvastatin (CRESTOR) 5 MG Tab Take 5 mg by mouth every evening.      omeprazole (PRILOSEC) 40 MG delayed-release capsule Take 40 mg by mouth every day.  0    Cholecalciferol (VITAMIN D PO) Take  by mouth.      LEVOXYL 175 MCG Tab Take 175 mcg by mouth every day. (Patient not taking: Reported on 2/23/2022)  1    simvastatin (ZOCOR) 40 MG Tab Take 40 mg by mouth every day. (Patient not taking: Reported on 2/23/2022)  5     No current facility-administered medications for this visit.    \"CURRENT RX\"    ALLERGIES: Patient has no known allergies.    ROS    Constitutional: Denies fever, chills, sweats,  weight loss, fatigue  Cardiovascular: Denies chest pain, tightness, palpitations, swelling in legs/feet, fainting, difficulty breathing when lying down but gets better when sitting up.   Respiratory: Denies shortness of breath, cough, sputum, wheezing, " "painful breathing, coughing up blood.   Sleep: per HPI  Gastrointestinal: Denies  difficulty swallowing, nausea, abdominal pain, diarrhea, constipation, heartburn.  Musculoskeletal: Denies painful joints, sore muscles, back pain.        PHYSICAL EXAM      /82 (BP Location: Left arm, Patient Position: Sitting, BP Cuff Size: Adult)   Pulse 72   Resp 16   Ht 1.956 m (6' 5\")   Wt 103 kg (226 lb)   SpO2 96%   BMI 26.80 kg/m²   Appearance: Well-nourished, well-developed, no acute distress  Eyes:  PERRLA, EOMI  ENMT: masked  Neck: Supple, trachea midline, no masses  Respiratory effort:  No intercostal retractions or use of accessory muscles  Lung auscultation:  No wheezes rhonchi rubs or rales  Cardiac: No murmurs, rubs, or gallops; regular rhythm, normal rate; no edema  Abdomen:  No tenderness, no organomegaly.  Musculoskeletal:  Grossly normal; gait and station normal; digits and nails normal  Skin:  No rashes, petechiae, cyanosis  Neurologic: without focal signs; oriented to person, time, place, and purpose; judgement intact  Psychiatric:  No depression, anxiety, agitation      Medical Decision Making       The medical record was reviewed in its entirety including the referral notes, records from primary care, consultants notes, hospital records, lab, imaging, microbiology, immunology, and immunizations.  Care gaps identified and reviewed with the patient.    Diagnostic and titration nocturnal polysomnogram's, home sleep apnea tests, continuous nocturnal oximetry results, multiple sleep latency tests, and compliance reports reviewed.  1. Obstructive sleep apnea  - DME Mask and Supplies      PLAN:   Has been advised to continue the current APAP 5-15 cm water, clean equipment frequently, and get new mask and supplies as allowed by insurance and DME. Advised about potential problems including nasal obstruction/stuffiness, mask leak or discomfort , frequent awakenings, chill or dryness of the upper airway, " noise, inconvenience, and lack of benefit. Recommend an earlier appointment, if significant treatment barriers develop.    The risks of untreated sleep apnea were discussed with the patient at length. Patients with ROB are at increased risk of cardiovascular disease including systemic arterial hypertension, pulmonary arterial hypertension (transient or fixed), TIA, and an elevated risk of stroke, heart attack, sudden death, or arrhythmia between the hours of 11 PM and 6 AM. ROB patients have an increased risk of motor vehicle accidents, type 2 diabetes, GERD, repetitive mechanical trauma to pharyngeal muscles with inflammation and denervation, frequent EEG arousals leading to nonrestorative sleep, excessive daytime sleepiness, fatigue, depression, poor pain control, irritability, and lower quality of life.  The patient was advised to avoid driving a motor vehicle when drowsy.    Positive airway pressure will favorably impact many of the adverse conditions and effects provoked by ROB.    Have advised the patient to follow up with the appropriate healthcare practitioners for all other medical problems and issues.    Return in about 1 year (around 1/31/2024).    Total time 30 minutes                          non-smoker, hypothyroidism, GERD, dyslipidemia, and a history of polycythemia followed by Dr. Petersen.    Patient Active Problem List    Diagnosis Date Noted    Need for influenza vaccination 10/29/2019    Dyslipidemia 05/23/2019    Gastroesophageal reflux 05/23/2019    Other specified hypothyroidism 05/23/2019    Polycythemia 05/23/2019    Obstructive sleep apnea 11/05/2018    Non-smoker 11/05/2018       Past Medical History:   Diagnosis Date    Sleep apnea         History reviewed. No pertinent surgical history.    Family History   Problem Relation Age of Onset    Lung Cancer Mother     Heart Attack Father     Sleep Apnea Neg Hx        Social History     Socioeconomic History    Marital status:      Spouse  "name: Not on file    Number of children: Not on file    Years of education: Not on file    Highest education level: Not on file   Occupational History    Not on file   Tobacco Use    Smoking status: Never    Smokeless tobacco: Never   Vaping Use    Vaping Use: Never used   Substance and Sexual Activity    Alcohol use: Yes     Alcohol/week: 1.8 oz     Types: 3 Cans of beer per week     Comment: occ    Drug use: No    Sexual activity: Not on file   Other Topics Concern    Not on file   Social History Narrative    Not on file     Social Determinants of Health     Financial Resource Strain: Not on file   Food Insecurity: Not on file   Transportation Needs: Not on file   Physical Activity: Not on file   Stress: Not on file   Social Connections: Not on file   Intimate Partner Violence: Not on file   Housing Stability: Not on file       Current Outpatient Medications   Medication Sig Dispense Refill    levothyroxine (SYNTHROID) 150 MCG Tab Take 150 mcg by mouth every day.      rosuvastatin (CRESTOR) 5 MG Tab Take 5 mg by mouth every evening.      omeprazole (PRILOSEC) 40 MG delayed-release capsule Take 40 mg by mouth every day.  0    Cholecalciferol (VITAMIN D PO) Take  by mouth.      LEVOXYL 175 MCG Tab Take 175 mcg by mouth every day. (Patient not taking: Reported on 2/23/2022)  1    simvastatin (ZOCOR) 40 MG Tab Take 40 mg by mouth every day. (Patient not taking: Reported on 2/23/2022)  5     No current facility-administered medications for this visit.    \"CURRENT RX\"    ALLERGIES: Patient has no known allergies.    ROS    Constitutional: Denies fever, chills, sweats,  weight loss, fatigue  Cardiovascular: Denies chest pain, tightness, palpitations, swelling in legs/feet, fainting, difficulty breathing when lying down but gets better when sitting up.   Respiratory: Denies shortness of breath, cough, sputum, wheezing, painful breathing, coughing up blood.   Sleep: per HPI  Gastrointestinal: Denies  difficulty swallowing, " "nausea, abdominal pain, diarrhea, constipation, heartburn.  Musculoskeletal: Denies painful joints, sore muscles, back pain.        PHYSICAL EXAM      /82 (BP Location: Left arm, Patient Position: Sitting, BP Cuff Size: Adult)   Pulse 72   Resp 16   Ht 1.956 m (6' 5\")   Wt 103 kg (226 lb)   SpO2 96%   BMI 26.80 kg/m²   Appearance: Well-nourished, well-developed, no acute distress  Eyes:  PERRLA, EOMI  ENMT: masked  Neck: Supple, trachea midline, no masses  Respiratory effort:  No intercostal retractions or use of accessory muscles  Lung auscultation:  No wheezes rhonchi rubs or rales  Cardiac: No murmurs, rubs, or gallops; regular rhythm, normal rate; no edema  Abdomen:  No tenderness, no organomegaly.  Musculoskeletal:  Grossly normal; gait and station normal; digits and nails normal  Skin:  No rashes, petechiae, cyanosis  Neurologic: without focal signs; oriented to person, time, place, and purpose; judgement intact  Psychiatric:  No depression, anxiety, agitation      Medical Decision Making       The medical record was reviewed in its entirety including the referral notes, records from primary care, consultants notes, hospital records, lab, imaging, microbiology, immunology, and immunizations.  Care gaps identified and reviewed with the patient.    Diagnostic and titration nocturnal polysomnogram's, home sleep apnea tests, continuous nocturnal oximetry results, multiple sleep latency tests, and compliance reports reviewed.  1. Obstructive sleep apnea  - DME Mask and Supplies      PLAN:   Has been advised to continue the current APAP, clean equipment frequently, and get new mask and supplies as allowed by insurance and DME. Advised about potential problems including nasal obstruction/stuffiness, mask leak or discomfort , frequent awakenings, chill or dryness of the upper airway, noise, inconvenience, and lack of benefit. Recommend an earlier appointment, if significant treatment barriers " develop.    The risks of untreated sleep apnea were discussed with the patient at length. Patients with ROB are at increased risk of cardiovascular disease including systemic arterial hypertension, pulmonary arterial hypertension (transient or fixed), TIA, and an elevated risk of stroke, heart attack, sudden death, or arrhythmia between the hours of 11 PM and 6 AM. ROB patients have an increased risk of motor vehicle accidents, type 2 diabetes, GERD, repetitive mechanical trauma to pharyngeal muscles with inflammation and denervation, frequent EEG arousals leading to nonrestorative sleep, excessive daytime sleepiness, fatigue, depression, poor pain control, irritability, and lower quality of life.  The patient was advised to avoid driving a motor vehicle when drowsy.    Positive airway pressure will favorably impact many of the adverse conditions and effects provoked by ROB.    Have advised the patient to follow up with the appropriate healthcare practitioners for all other medical problems and issues.    Return in about 1 year (around 1/31/2024).    Total time 30 minutes

## 2023-01-31 ENCOUNTER — OFFICE VISIT (OUTPATIENT)
Dept: SLEEP MEDICINE | Facility: MEDICAL CENTER | Age: 63
End: 2023-01-31
Payer: COMMERCIAL

## 2023-01-31 VITALS
HEIGHT: 77 IN | HEART RATE: 72 BPM | SYSTOLIC BLOOD PRESSURE: 124 MMHG | DIASTOLIC BLOOD PRESSURE: 82 MMHG | WEIGHT: 226 LBS | OXYGEN SATURATION: 96 % | BODY MASS INDEX: 26.68 KG/M2 | RESPIRATION RATE: 16 BRPM

## 2023-01-31 DIAGNOSIS — G47.33 OBSTRUCTIVE SLEEP APNEA: ICD-10-CM

## 2023-01-31 PROCEDURE — 99214 OFFICE O/P EST MOD 30 MIN: CPT | Performed by: INTERNAL MEDICINE

## 2024-01-05 NOTE — PROGRESS NOTES
CC: Annual visit for ROB on CPAP 5-15 CWP.        HPI:  Abner Magdaleno is a 63 y.o. just retired male  kindly referred by Erwin Mc M.D. and last seen by Dr. NAVARRO on 1/31/2023 when his compliance was 100% for 6 hours and 11 minutes and his AHI slightly elevated 7.8 on APAP 5-15 CWP.  His auto CPAP mean pressure was only 6.8 CWP and he was suspected to have some residual central events.    A 6/7/18 home study showed very severe obstructive sleep apnea hypopnea with an RALPH of 56 and a cj saturation 79%.    Today, 1/8/2024, his 30-day compliance is 100% for 6 hours and 20 minutes.  His average residual estimated apnea hypopnea index is a slightly elevated 6.0.  His auto CPAP mean pressure 6.7 and his auto CPAP peak average pressure is 12.4 CWP.  The residually high AHI may relate to some untreated central events.      The patient reports improved symptoms using positive airway pressure.  Has experienced no significant problems with claustrophobia, nosebleeds, sore throat, dry eyes, mask fit, air leaks around the mask, pressure tolerance, excessive airway dryness, dry or runny nose, nasal congestion, facial irritation, aerophagia, or chest pain.       Significant modifying factors and comorbidities include care giver stress (wife has Parkinson's), prior liver abscess, possible immunization care gaps, non-smoker, hypothyroidism, GERD, dyslipidemia, and a history of polycythemia followed by Dr. Petersen.    Since he still had an elevated hemoglobin we performed an overnight oximetry on PAP which showed saturations in general were satisfactory and no interventions were needed.  Patient Active Problem List    Diagnosis Date Noted    Need for influenza vaccination 10/29/2019    Dyslipidemia 05/23/2019    Gastroesophageal reflux 05/23/2019    Other specified hypothyroidism 05/23/2019    Polycythemia 05/23/2019    Obstructive sleep apnea 11/05/2018    Non-smoker 11/05/2018       Past Medical History:   Diagnosis Date  "   Sleep apnea         No past surgical history on file.    Family History   Problem Relation Age of Onset    Lung Cancer Mother     Heart Attack Father     Sleep Apnea Neg Hx        Social History     Socioeconomic History    Marital status:      Spouse name: Not on file    Number of children: Not on file    Years of education: Not on file    Highest education level: Not on file   Occupational History    Not on file   Tobacco Use    Smoking status: Never    Smokeless tobacco: Never   Vaping Use    Vaping Use: Never used   Substance and Sexual Activity    Alcohol use: Yes     Alcohol/week: 1.8 oz     Types: 3 Cans of beer per week     Comment: occ    Drug use: No    Sexual activity: Not on file   Other Topics Concern    Not on file   Social History Narrative    Not on file     Social Determinants of Health     Financial Resource Strain: Not on file   Food Insecurity: Not on file   Transportation Needs: Not on file   Physical Activity: Not on file   Stress: Not on file   Social Connections: Not on file   Intimate Partner Violence: Not on file   Housing Stability: Not on file       Current Outpatient Medications   Medication Sig Dispense Refill    levothyroxine (SYNTHROID) 150 MCG Tab Take 150 mcg by mouth every day.      rosuvastatin (CRESTOR) 5 MG Tab Take 5 mg by mouth every evening.      omeprazole (PRILOSEC) 40 MG delayed-release capsule Take 40 mg by mouth every day.  0    Cholecalciferol (VITAMIN D PO) Take  by mouth.      LEVOXYL 175 MCG Tab Take 175 mcg by mouth every day. (Patient not taking: Reported on 2/23/2022)  1    simvastatin (ZOCOR) 40 MG Tab Take 40 mg by mouth every day. (Patient not taking: Reported on 2/23/2022)  5     No current facility-administered medications for this visit.    \"CURRENT RX\"    ALLERGIES: Patient has no known allergies.    ROS    Constitutional: Denies fever, chills, sweats,  weight loss, fatigue  Cardiovascular: Denies chest pain, tightness, palpitations, swelling in " "legs/feet, fainting, difficulty breathing when lying down but gets better when sitting up.   Respiratory: Denies shortness of breath, cough, sputum, wheezing, painful breathing, coughing up blood.   Sleep: per HPI  Gastrointestinal: Denies  difficulty swallowing, nausea, abdominal pain, diarrhea, constipation, heartburn.  Musculoskeletal: Denies painful joints, sore muscles, back pain.        PHYSICAL EXAM      /82 (BP Location: Left arm, Patient Position: Sitting, BP Cuff Size: Adult)   Pulse 67   Resp 14   Ht 1.956 m (6' 5\")   Wt 103 kg (226 lb)   SpO2 95%   BMI 26.80 kg/m²   Appearance: Well-nourished, well-developed, no acute distress  Eyes:  PERRLA, EOMI  ENMT: without change  Neck: Supple, trachea midline, no masses  Respiratory effort:  No intercostal retractions or use of accessory muscles  Lung auscultation:  No wheezes rhonchi rubs or rales  Cardiac: No murmurs, rubs, or gallops; regular rhythm, normal rate; no edema  Abdomen:  No tenderness, no organomegaly.  Musculoskeletal:  Grossly normal; gait and station normal; digits and nails normal  Skin:  No rashes, petechiae, cyanosis  Neurologic: without focal signs; oriented to person, time, place, and purpose; judgement intact  Psychiatric:  No depression, anxiety, agitation      Medical Decision Making       The medical record was reviewed in its entirety including the referral notes, records from primary care, consultants notes, hospital records, lab, imaging, microbiology, immunology, and immunizations.  Care gaps identified and reviewed with the patient.    Diagnostic and titration nocturnal polysomnogram's, home sleep apnea tests, continuous nocturnal oximetry results, multiple sleep latency tests, and compliance reports reviewed.  1. Obstructive sleep apnea  - DME Mask and Supplies      PLAN:   Has been advised to continue the current apap, clean equipment frequently, and get new mask and supplies as allowed by insurance and DME. Advised " about potential problems including nasal obstruction/stuffiness, mask leak or discomfort , frequent awakenings, chill or dryness of the upper airway, noise, inconvenience, and lack of benefit. Recommend an earlier appointment, if significant treatment barriers develop.    The risks of untreated sleep apnea were discussed with the patient at length. Patients with ROB are at increased risk of cardiovascular disease including systemic arterial hypertension, pulmonary arterial hypertension (transient or fixed), TIA, and an elevated risk of stroke, heart attack, sudden death, or arrhythmia between the hours of 11 PM and 6 AM. ROB patients have an increased risk of motor vehicle accidents, type 2 diabetes, GERD, repetitive mechanical trauma to pharyngeal muscles with inflammation and denervation, frequent EEG arousals leading to nonrestorative sleep, excessive daytime sleepiness, fatigue, depression, poor pain control, irritability, and lower quality of life.  The patient was advised to avoid driving a motor vehicle when drowsy.    Positive airway pressure will favorably impact many of the adverse conditions and effects provoked by ROB.    Have advised the patient to follow up with the appropriate healthcare practitioners for all other medical problems and issues.    Return in about 1 year (around 1/8/2025).    Total time 30 minutes

## 2024-01-08 ENCOUNTER — OFFICE VISIT (OUTPATIENT)
Dept: SLEEP MEDICINE | Facility: MEDICAL CENTER | Age: 64
End: 2024-01-08
Attending: INTERNAL MEDICINE
Payer: COMMERCIAL

## 2024-01-08 VITALS
HEIGHT: 77 IN | BODY MASS INDEX: 26.68 KG/M2 | DIASTOLIC BLOOD PRESSURE: 82 MMHG | OXYGEN SATURATION: 95 % | RESPIRATION RATE: 14 BRPM | HEART RATE: 67 BPM | SYSTOLIC BLOOD PRESSURE: 124 MMHG | WEIGHT: 226 LBS

## 2024-01-08 DIAGNOSIS — G47.33 OBSTRUCTIVE SLEEP APNEA: ICD-10-CM

## 2024-01-08 PROCEDURE — 99213 OFFICE O/P EST LOW 20 MIN: CPT | Performed by: INTERNAL MEDICINE

## 2024-01-08 PROCEDURE — 3074F SYST BP LT 130 MM HG: CPT | Performed by: INTERNAL MEDICINE

## 2024-01-08 PROCEDURE — 3079F DIAST BP 80-89 MM HG: CPT | Performed by: INTERNAL MEDICINE

## 2024-01-08 PROCEDURE — 99214 OFFICE O/P EST MOD 30 MIN: CPT | Performed by: INTERNAL MEDICINE

## 2024-01-08 ASSESSMENT — PATIENT HEALTH QUESTIONNAIRE - PHQ9: CLINICAL INTERPRETATION OF PHQ2 SCORE: 0

## 2024-10-10 ENCOUNTER — APPOINTMENT (RX ONLY)
Dept: URBAN - METROPOLITAN AREA CLINIC 4 | Facility: CLINIC | Age: 64
Setting detail: DERMATOLOGY
End: 2024-10-10

## 2024-10-10 DIAGNOSIS — Z71.89 OTHER SPECIFIED COUNSELING: ICD-10-CM

## 2024-10-10 DIAGNOSIS — D485 NEOPLASM OF UNCERTAIN BEHAVIOR OF SKIN: ICD-10-CM

## 2024-10-10 PROBLEM — D48.5 NEOPLASM OF UNCERTAIN BEHAVIOR OF SKIN: Status: ACTIVE | Noted: 2024-10-10

## 2024-10-10 PROCEDURE — 11102 TANGNTL BX SKIN SINGLE LES: CPT

## 2024-10-10 PROCEDURE — ? COUNSELING

## 2024-10-10 PROCEDURE — ? SUNSCREEN RECOMMENDATIONS

## 2024-10-10 PROCEDURE — ? BIOPSY BY SHAVE METHOD

## 2024-10-10 ASSESSMENT — LOCATION SIMPLE DESCRIPTION DERM: LOCATION SIMPLE: GLABELLA

## 2024-10-10 ASSESSMENT — LOCATION ZONE DERM: LOCATION ZONE: FACE

## 2024-10-10 ASSESSMENT — LOCATION DETAILED DESCRIPTION DERM: LOCATION DETAILED: GLABELLA

## 2024-10-10 NOTE — PROCEDURE: BIOPSY BY SHAVE METHOD
Detail Level: Detailed
Depth Of Biopsy: dermis
Was A Bandage Applied: Yes
Size Of Lesion In Cm: 1.4
X Size Of Lesion In Cm: 0
Biopsy Type: H and E
Biopsy Method: Dermablade
Anesthesia Type: 1% lidocaine without epinephrine
Anesthesia Volume In Cc: 0.5
Hemostasis: Drysol
Wound Care: Petrolatum
Dressing: bandage
Destruction After The Procedure: No
Type Of Destruction Used: Curettage
Curettage Text: The wound bed was treated with curettage after the biopsy was performed.
Cryotherapy Text: The wound bed was treated with cryotherapy after the biopsy was performed.
Electrodesiccation Text: The wound bed was treated with electrodesiccation after the biopsy was performed.
Electrodesiccation And Curettage Text: The wound bed was treated with electrodesiccation and curettage after the biopsy was performed.
Silver Nitrate Text: The wound bed was treated with silver nitrate after the biopsy was performed.
Lab: [id number]
Lab Facility: 
Consent: Written consent was obtained and risks were reviewed including but not limited to scarring, infection, bleeding, scabbing, incomplete removal, nerve damage and allergy to anesthesia.
Post-Care Instructions: I reviewed with the patient in detail post-care instructions. Patient is to keep the biopsy site dry overnight, and then apply bacitracin twice daily until healed. Patient may apply hydrogen peroxide soaks to remove any crusting.
Notification Instructions: Patient will be notified of biopsy results. However, patient instructed to call the office if not contacted within 2 weeks.
Billing Type: Third-Party Bill
Information: Selecting Yes will display possible errors in your note based on the variables you have selected. This validation is only offered as a suggestion for you. PLEASE NOTE THAT THE VALIDATION TEXT WILL BE REMOVED WHEN YOU FINALIZE YOUR NOTE. IF YOU WANT TO FAX A PRELIMINARY NOTE YOU WILL NEED TO TOGGLE THIS TO 'NO' IF YOU DO NOT WANT IT IN YOUR FAXED NOTE.

## 2024-12-05 ENCOUNTER — APPOINTMENT (OUTPATIENT)
Dept: URBAN - METROPOLITAN AREA CLINIC 36 | Facility: CLINIC | Age: 64
Setting detail: DERMATOLOGY
End: 2024-12-05

## 2024-12-05 PROBLEM — C44.319 BASAL CELL CARCINOMA OF SKIN OF OTHER PARTS OF FACE: Status: ACTIVE | Noted: 2024-12-05

## 2024-12-05 PROCEDURE — 17311 MOHS 1 STAGE H/N/HF/G: CPT

## 2024-12-05 PROCEDURE — 13131 CMPLX RPR F/C/C/M/N/AX/G/H/F: CPT

## 2024-12-05 PROCEDURE — ? MOHS SURGERY

## 2024-12-05 PROCEDURE — 17312 MOHS ADDL STAGE: CPT

## 2024-12-05 PROCEDURE — ? DIAGNOSIS COMMENT

## 2024-12-05 NOTE — PROCEDURE: MIPS QUALITY
yes
Quality 226: Preventive Care And Screening: Tobacco Use: Screening And Cessation Intervention: Patient screened for tobacco use and is an ex/non-smoker
Quality 130: Documentation Of Current Medications In The Medical Record: Current Medications Documented
Detail Level: Detailed
Quality 47: Advance Care Plan: Advance Care Planning discussed and documented; advance care plan or surrogate decision maker documented in the medical record.

## 2024-12-05 NOTE — PROCEDURE: MOHS SURGERY
Show Mohs Stages In Case Summary (If You Hide Here Stages Will Be Calculated By Your Documentation In The Stages Tab)?: Yes
Plastic Surgeon Procedure Text (B): After obtaining clear surgical margins the patient was sent to plastics for surgical repair.  The patient understands they will receive post-surgical care and follow-up from the referring physician's office.
Referring Physician (Optional): Joya BUSTAMANTE
Pain Refusal Text: I offered to prescribe pain medication but the patient refused to take this medication.
Intermediate Repair And Flap Additional Text (Will Appearing After The Standard Complex Repair Text): The intermediate repair was not sufficient to completely close the primary defect. The remaining additional defect was repaired with the flap mentioned below.
Tissue Cultured Epidermal Autograft Text: The defect edges were debeveled with a #15 scalpel blade.  Given the location of the defect, shape of the defect and the proximity to free margins a tissue cultured epidermal autograft was deemed most appropriate.  The graft was then trimmed to fit the size of the defect.  The graft was then placed in the primary defect and oriented appropriately.
Repair Type: Complex Repair
Validate That Repair Assistants Are Chosen (Can Hide Repair Assistants In The Settings Tab): No
Stage 4: Additional Anesthesia Volume In Cc: 0
Melolabial Interpolation Flap Text: A decision was made to reconstruct the defect utilizing an interpolation axial flap and a staged reconstruction.  A telfa template was made of the defect.  This telfa template was then used to outline the melolabial interpolation flap.  The donor area for the pedicle flap was then injected with anesthesia.  The flap was excised through the skin and subcutaneous tissue down to the layer of the underlying musculature.  The pedicle flap was carefully excised within this deep plane to maintain its blood supply.  The edges of the donor site were undermined.   The donor site was closed in a primary fashion.  The pedicle was then rotated into position and sutured.  Once the tube was sutured into place, adequate blood supply was confirmed with blanching and refill.  The pedicle was then wrapped with xeroform gauze and dressed appropriately with a telfa and gauze bandage to ensure continued blood supply and protect the attached pedicle.
Mart-1 - Negative Histology Text: MART-1 staining demonstrates a normal density and pattern of melanocytes along the dermal-epidermal junction. The surgical margins are negative for tumor cells.
Consent (Near Eyelid Margin)/Introductory Paragraph: The rationale for Mohs was explained to the patient and consent was obtained. The risks, benefits and alternatives to therapy were discussed in detail. Specifically, the risks of ectropion or eyelid deformity, infection, scarring, bleeding, prolonged wound healing, incomplete removal, allergy to anesthesia, nerve injury and recurrence were addressed. Prior to the procedure, the treatment site was clearly identified and confirmed by the patient. All components of Universal Protocol/PAUSE Rule completed.
Partial Purse String (Intermediate) Text: Given the location of the defect and the characteristics of the surrounding skin an intermediate purse string closure was deemed most appropriate.  Undermining was performed circumfirentially around the surgical defect.  A purse string suture was then placed and tightened. Wound tension only allowed a partial closure of the circular defect.
Eye Protection Verbiage: Before proceeding with the stage, a plastic scleral shield was inserted. The globe was anesthetized with a few drops of 1% lidocaine with 1:100,000 epinephrine. Then, an appropriate sized scleral shield was chosen and coated with lacrilube ointment. The shield was gently inserted and left in place for the duration of each stage. After the stage was completed, the shield was gently removed.
A-T Advancement Flap Text: The defect edges were debeveled with a #15 scalpel blade.  Given the location of the defect, shape of the defect and the proximity to free margins an A-T advancement flap was deemed most appropriate.  Using a sterile surgical marker, an appropriate advancement flap was drawn incorporating the defect and placing the expected incisions within the relaxed skin tension lines where possible.    The area thus outlined was incised deep to adipose tissue with a #15 scalpel blade.  The skin margins were undermined to an appropriate distance in all directions utilizing iris scissors.
Skin Substitute Text: Given the location of the defect, shape/depth of the defect and the underlying tissue, a skin substitute graft was deemed most appropriate.  The graft was placed in the primary defect and oriented appropriately.
Localized Dermabrasion With 15 Blade Text: The patient was draped in routine manner.  Localized dermabrasion using a 15 blade was performed in routine manner to papillary dermis. This spot dermabrasion is being performed to complete skin cancer reconstruction. It also will eliminate the other sun damaged precancerous cells that are known to be part of the regional effect of a lifetime's worth of sun exposure. This localized dermabrasion is therapeutic and should not be considered cosmetic in any regard.
Special Stains Stage 2 - Results: Base On Clearance Noted Above
Chonodrocutaneous Helical Advancement Flap Text: The defect edges were debeveled with a #15 scalpel blade.  Given the location of the defect and the proximity to free margins a chondrocutaneous helical advancement flap was deemed most appropriate.  Using a sterile surgical marker, the appropriate advancement flap was drawn incorporating the defect and placing the expected incisions within the relaxed skin tension lines where possible.    The area thus outlined was incised deep to adipose tissue with a #15 scalpel blade.  The skin margins were undermined to an appropriate distance in all directions utilizing iris scissors.
Double Island Pedicle Flap Text: The defect edges were debeveled with a #15 scalpel blade.  Given the location of the defect, shape of the defect and the proximity to free margins a double island pedicle advancement flap was deemed most appropriate.  Using a sterile surgical marker, an appropriate advancement flap was drawn incorporating the defect, outlining the appropriate donor tissue and placing the expected incisions within the relaxed skin tension lines where possible.    The area thus outlined was incised deep to adipose tissue with a #15 scalpel blade.  The skin margins were undermined to an appropriate distance in all directions around the primary defect and laterally outward around the island pedicle utilizing iris scissors.  There was minimal undermining beneath the pedicle flap.
M-Plasty Intermediate Repair Preamble Text (Leave Blank If You Do Not Want): Undermining was performed with blunt dissection.
Estimated Blood Loss (Cc): minimal
Suturegard Body: The suture ends were repeatedly re-tightened and re-clamped to achieve the desired tissue expansion.
Asc Procedure Text (E): After obtaining clear surgical margins the patient was sent to an ASC for surgical repair.  The patient understands they will receive post-surgical care and follow-up from the ASC physician.
Otolaryngologist Procedure Text (E): After obtaining clear surgical margins the patient was sent to otolaryngology for surgical repair.  The patient understands they will receive post-surgical care and follow-up from the referring physician's office.
Bi-Rhombic Flap Text: The defect edges were debeveled with a #15 scalpel blade.  Given the location of the defect and the proximity to free margins a bi-rhombic flap was deemed most appropriate.  Using a sterile surgical marker, an appropriate rhombic flap was drawn incorporating the defect. The area thus outlined was incised deep to adipose tissue with a #15 scalpel blade.  The skin margins were undermined to an appropriate distance in all directions utilizing iris scissors.
Consent 1/Introductory Paragraph: The rationale for Mohs was explained to the patient and consent was obtained. The risks, benefits and alternatives to therapy were discussed in detail. Specifically, the risks of infection, scarring, bleeding, prolonged wound healing, incomplete removal, allergy to anesthesia, nerve injury and recurrence were addressed. Prior to the procedure, the treatment site was clearly identified and confirmed by the patient. All components of Universal Protocol/PAUSE Rule completed.
Transposition Flap Text: The defect edges were debeveled with a #15 scalpel blade.  Given the location of the defect and the proximity to free margins a transposition flap was deemed most appropriate.  Using a sterile surgical marker, an appropriate transposition flap was drawn incorporating the defect.    The area thus outlined was incised deep to adipose tissue with a #15 scalpel blade.  The skin margins were undermined to an appropriate distance in all directions utilizing iris scissors.
Simple / Intermediate / Complex Repair - Final Wound Length In Cm: 2.1
Flip-Flop Flap Text: The defect edges were debeveled with a #15 blade scalpel.  Given the location of the defect and the proximity to free margins a flip-flop flap was deemed most appropriate. Using a sterile surgical marker, the appropriate flap was drawn incorporating the defect and placing the expected incisions between the helical rim and antihelix where possible.  The area thus outlined was incised through and through with a #15 scalpel blade. Following this, the designed flap was carried over into the primary defect and sutured into place.
Full Thickness Lip Wedge Repair (Flap) Text: Given the location of the defect and the proximity to free margins a full thickness wedge repair was deemed most appropriate.  Using a sterile surgical marker, the appropriate repair was drawn incorporating the defect and placing the expected incisions perpendicular to the vermilion border.  The vermilion border was also meticulously outlined to ensure appropriate reapproximation during the repair.  The area thus outlined was incised through and through with a #15 scalpel blade.  The muscularis and dermis were reaproximated with deep sutures following hemostasis. Care was taken to realign the vermilion border before proceeding with the superficial closure.  Once the vermilion was realigned the superfical and mucosal closure was finished.
M-Plasty Complex Repair Preamble Text (Leave Blank If You Do Not Want): Extensive wide undermining was performed.
Stage 2: Number Of Blocks?: 1
Consent (Ear)/Introductory Paragraph: The rationale for Mohs was explained to the patient and consent was obtained. The risks, benefits and alternatives to therapy were discussed in detail. Specifically, the risks of ear deformity, infection, scarring, bleeding, prolonged wound healing, incomplete removal, allergy to anesthesia, nerve injury and recurrence were addressed. Prior to the procedure, the treatment site was clearly identified and confirmed by the patient. All components of Universal Protocol/PAUSE Rule completed.
Stage 3: Additional Anesthesia Type: 1% lidocaine with epinephrine
Eyelid Full Thickness Repair - 72805: The eyelid defect was full thickness which required a wedge repair of the eyelid. Special care was taken to ensure that the eyelid margin was realligned when placing sutures.
V-Y Flap Text: The defect edges were debeveled with a #15 scalpel blade.  Given the location of the defect, shape of the defect and the proximity to free margins a V-Y flap was deemed most appropriate.  Using a sterile surgical marker, an appropriate advancement flap was drawn incorporating the defect and placing the expected incisions within the relaxed skin tension lines where possible.    The area thus outlined was incised deep to adipose tissue with a #15 scalpel blade.  The skin margins were undermined to an appropriate distance in all directions utilizing iris scissors.
Dorsal Nasal Flap Text: The defect edges were debeveled with a #15 scalpel blade.  Given the location of the defect and the proximity to free margins a dorsal nasal flap was deemed most appropriate.  Using a sterile surgical marker, an appropriate dorsal nasal flap was drawn around the defect.    The area thus outlined was incised deep to adipose tissue with a #15 scalpel blade.  The skin margins were undermined to an appropriate distance in all directions utilizing iris scissors.
Perineural Invasion (For Histology - Be Specific If Possible): absent
Suturegard Retention Suture: 0-0 Nylon
Anesthesia Type: 1% lidocaine with epinephrine and a 1:10 solution of 8.4% sodium bicarbonate
Mucosal Advancement Flap Text: Given the location of the defect, shape of the defect and the proximity to free margins a mucosal advancement flap was deemed most appropriate. Incisions were made with a 15 blade scalpel in the appropriate fashion along the cutaneous vermilion border and the mucosal lip. The remaining actinically damaged mucosal tissue was excised.  The mucosal advancement flap was then elevated to the gingival sulcus with care taken to preserve the neurovascular structures and advanced into the primary defect. Care was taken to ensure that precise realignment of the vermilion border was achieved.
Referred To Mid-Level For Closure Text (Leave Blank If You Do Not Want): After obtaining clear surgical margins the patient was sent to a mid-level provider for surgical repair.  The patient understands they will receive post-surgical care and follow-up from the mid-level provider.
Paramedian Forehead Flap Text: A decision was made to reconstruct the defect utilizing an interpolation axial flap and a staged reconstruction.  A telfa template was made of the defect.  This telfa template was then used to outline the paramedian forehead pedicle flap.  The donor area for the pedicle flap was then injected with anesthesia.  The flap was excised through the skin and subcutaneous tissue down to the layer of the underlying musculature.  The pedicle flap was carefully excised within this deep plane to maintain its blood supply.  The edges of the donor site were undermined.   The donor site was closed in a primary fashion.  The pedicle was then rotated into position and sutured.  Once the tube was sutured into place, adequate blood supply was confirmed with blanching and refill.  The pedicle was then wrapped with xeroform gauze and dressed appropriately with a telfa and gauze bandage to ensure continued blood supply and protect the attached pedicle.
Consent (Marginal Mandibular)/Introductory Paragraph: The rationale for Mohs was explained to the patient and consent was obtained. The risks, benefits and alternatives to therapy were discussed in detail. Specifically, the risks of damage to the marginal mandibular branch of the facial nerve, infection, scarring, bleeding, prolonged wound healing, incomplete removal, allergy to anesthesia, and recurrence were addressed. Prior to the procedure, the treatment site was clearly identified and confirmed by the patient. All components of Universal Protocol/PAUSE Rule completed.
Suture Removal: 7 days
Mohs Histo Method Verbiage: Each section was then chromacoded and processed in the Mohs lab using the Mohs protocol and submitted for frozen section.
Oculoplastic Surgeon Procedure Text (D): After obtaining clear surgical margins the patient was sent to oculoplastics for surgical repair.  The patient understands they will receive post-surgical care and follow-up from the referring physician's office.
Epidermal Closure: running
Consent (Nose)/Introductory Paragraph: The rationale for Mohs was explained to the patient and consent was obtained. The risks, benefits and alternatives to therapy were discussed in detail. Specifically, the risks of nasal deformity, changes in the flow of air through the nose, infection, scarring, bleeding, prolonged wound healing, incomplete removal, allergy to anesthesia, nerve injury and recurrence were addressed. Prior to the procedure, the treatment site was clearly identified and confirmed by the patient. All components of Universal Protocol/PAUSE Rule completed.
Bill 59 Modifier?: No - Continue to Bill 79 Modifier
Island Pedicle Flap With Canthal Suspension Text: The defect edges were debeveled with a #15 scalpel blade.  Given the location of the defect, shape of the defect and the proximity to free margins an island pedicle advancement flap was deemed most appropriate.  Using a sterile surgical marker, an appropriate advancement flap was drawn incorporating the defect, outlining the appropriate donor tissue and placing the expected incisions within the relaxed skin tension lines where possible. The area thus outlined was incised deep to adipose tissue with a #15 scalpel blade.  The skin margins were undermined to an appropriate distance in all directions around the primary defect and laterally outward around the island pedicle utilizing iris scissors.  There was minimal undermining beneath the pedicle flap. A suspension suture was placed in the canthal tendon to prevent tension and prevent ectropion.
Alar Island Pedicle Flap Text: The defect edges were debeveled with a #15 scalpel blade.  Given the location of the defect, shape of the defect and the proximity to the alar rim an island pedicle advancement flap was deemed most appropriate.  Using a sterile surgical marker, an appropriate advancement flap was drawn incorporating the defect, outlining the appropriate donor tissue and placing the expected incisions within the nasal ala running parallel to the alar rim. The area thus outlined was incised with a #15 scalpel blade.  The skin margins were undermined minimally to an appropriate distance in all directions around the primary defect and laterally outward around the island pedicle utilizing iris scissors.  There was minimal undermining beneath the pedicle flap.
Mohs Method Verbiage: An incision at a 45 degree angle following the standard Mohs approach was done and the specimen was harvested as a microscopic controlled layer.
Cartilage Graft Text: The defect edges were debeveled with a #15 scalpel blade.  Given the location of the defect, shape of the defect, the fact the defect involved a full thickness cartilage defect a cartilage graft was deemed most appropriate.  An appropriate donor site was identified, cleansed, and anesthetized. The cartilage graft was then harvested and transferred to the recipient site, oriented appropriately and then sutured into place.  The secondary defect was then repaired using a primary closure.
Mohs Case Number: [id number]
Surgeon: Jarrett Padilla MD
Consent 3/Introductory Paragraph: I gave the patient a chance to ask questions they had about the procedure.  Following this I explained the Mohs procedure and consent was obtained. The risks, benefits and alternatives to therapy were discussed in detail. Specifically, the risks of infection, scarring, bleeding, prolonged wound healing, incomplete removal, allergy to anesthesia, nerve injury and recurrence were addressed. Prior to the procedure, the treatment site was clearly identified and confirmed by the patient. All components of Universal Protocol/PAUSE Rule completed.
Provider Procedure Text (D): After obtaining clear surgical margins the defect was repaired by another provider.
Split-Thickness Skin Graft Text: The defect edges were debeveled with a #15 scalpel blade.  Given the location of the defect, shape of the defect and the proximity to free margins a split thickness skin graft was deemed most appropriate.  Using a sterile surgical marker, the primary defect shape was transferred to the donor site. The split thickness graft was then harvested.  The skin graft was then placed in the primary defect and oriented appropriately.
Island Pedicle Flap Text: The defect edges were debeveled with a #15 scalpel blade.  Given the location of the defect, shape of the defect and the proximity to free margins an island pedicle advancement flap was deemed most appropriate.  Using a sterile surgical marker, an appropriate advancement flap was drawn incorporating the defect, outlining the appropriate donor tissue and placing the expected incisions within the relaxed skin tension lines where possible.    The area thus outlined was incised deep to adipose tissue with a #15 scalpel blade.  The skin margins were undermined to an appropriate distance in all directions around the primary defect and laterally outward around the island pedicle utilizing iris scissors.  There was minimal undermining beneath the pedicle flap.
Debridement Text: The wound edges were debrided prior to proceeding with the closure to facilitate wound healing.
Additional Epidermal Closure (Optional): simple interrupted
Postop Diagnosis: same
Keystone Flap Text: The defect edges were debeveled with a #15 scalpel blade.  Given the location of the defect, shape of the defect a keystone flap was deemed most appropriate.  Using a sterile surgical marker, an appropriate keystone flap was drawn incorporating the defect, outlining the appropriate donor tissue and placing the expected incisions within the relaxed skin tension lines where possible. The area thus outlined was incised deep to adipose tissue with a #15 scalpel blade.  The skin margins were undermined to an appropriate distance in all directions around the primary defect and laterally outward around the flap utilizing iris scissors.
Cheiloplasty (Less Than 50%) Text: A decision was made to reconstruct the defect with a  cheiloplasty.  The defect was undermined extensively.  Additional obicularis oris muscle was excised with a 15 blade scalpel.  The defect was converted into a full thickness wedge, of less than 50% of the vertical height of the lip, to facilite a better cosmetic result.  Small vessels were then tied off with 5-0 monocyrl. The obicularis oris, superficial fascia, adipose and dermis were then reapproximated.  After the deeper layers were approximated the epidermis was reapproximated with particular care given to realign the vermilion border.
Advancement-Rotation Flap Text: The defect edges were debeveled with a #15 scalpel blade.  Given the location of the defect, shape of the defect and the proximity to free margins an advancement-rotation flap was deemed most appropriate.  Using a sterile surgical marker, an appropriate flap was drawn incorporating the defect and placing the expected incisions within the relaxed skin tension lines where possible. The area thus outlined was incised deep to adipose tissue with a #15 scalpel blade.  The skin margins were undermined to an appropriate distance in all directions utilizing iris scissors.
Same Histology In Subsequent Stages Text: The pattern and morphology of the tumor is as described in the first stage.
Mustarde Flap Text: The defect edges were debeveled with a #15 scalpel blade.  Given the size, depth and location of the defect and the proximity to free margins a Mustarde flap was deemed most appropriate.  Using a sterile surgical marker, an appropriate flap was drawn incorporating the defect. The area thus outlined was incised with a #15 scalpel blade.  The skin margins were undermined to an appropriate distance in all directions utilizing iris scissors.
Ear Wedge Repair Text: A wedge excision was completed by carrying down an excision through the full thickness of the ear and cartilage with an inward facing Burow's triangle. The wound was then closed in a layered fashion.
Nasalis Myocutaneous Flap Text: Using a #15 blade, an incision was made around the donor flap to the level of the nasalis muscle. Wide lateral undermining was then performed in both the subcutaneous plane above the nasalis muscle, and in a submuscular plane just above periosteum. This allowed the formation of a free nasalis muscle axial pedicle which was still attached to the actual cutaneous flap, increasing its mobility and vascular viability. Hemostasis was obtained with pinpoint electrocoagulation. The flap was mobilized into position and the pivotal anchor points positioned and stabilized with buried interrupted sutures. Subcutaneous and dermal tissues were closed in a multilayered fashion with sutures. Tissue redundancies were excised, and the epidermal edges were apposed without significant tension and sutured with sutures.
Modified Advancement Flap Text: The defect edges were debeveled with a #15 scalpel blade.  Given the location of the defect, shape of the defect and the proximity to free margins a modified advancement flap was deemed most appropriate.  Using a sterile surgical marker, an appropriate advancement flap was drawn incorporating the defect and placing the expected incisions within the relaxed skin tension lines where possible.    The area thus outlined was incised deep to adipose tissue with a #15 scalpel blade.  The skin margins were undermined to an appropriate distance in all directions utilizing iris scissors.
Bcc Histology Text: There were numerous aggregates of basaloid cells.
Number Of Stages: 2
Tarsorrhaphy Text: A tarsorrhaphy was performed using Frost sutures.
Double O-Z Plasty Text: The defect edges were debeveled with a #15 scalpel blade.  Given the location of the defect, shape of the defect and the proximity to free margins a Double O-Z plasty (double transposition flap) was deemed most appropriate.  Using a sterile surgical marker, the appropriate transposition flaps were drawn incorporating the defect and placing the expected incisions within the relaxed skin tension lines where possible. The area thus outlined was incised deep to adipose tissue with a #15 scalpel blade.  The skin margins were undermined to an appropriate distance in all directions utilizing iris scissors.  Hemostasis was achieved with electrocautery.  The flaps were then transposed into place, one clockwise and the other counterclockwise, and anchored with interrupted buried subcutaneous sutures.
Cheek-To-Nose Interpolation Flap Text: A decision was made to reconstruct the defect utilizing an interpolation axial flap and a staged reconstruction.  A telfa template was made of the defect.  This telfa template was then used to outline the Cheek-To-Nose Interpolation flap.  The donor area for the pedicle flap was then injected with anesthesia.  The flap was excised through the skin and subcutaneous tissue down to the layer of the underlying musculature.  The interpolation flap was carefully excised within this deep plane to maintain its blood supply.  The edges of the donor site were undermined.   The donor site was closed in a primary fashion.  The pedicle was then rotated into position and sutured.  Once the tube was sutured into place, adequate blood supply was confirmed with blanching and refill.  The pedicle was then wrapped with xeroform gauze and dressed appropriately with a telfa and gauze bandage to ensure continued blood supply and protect the attached pedicle.
Stage 2 Add-On Histology Text: AK noted in deep cuts, but margins clear
Closure 2 Information: This tab is for additional flaps and grafts, including complex repair and grafts and complex repair and flaps. You can also specify a different location for the additional defect, if the location is the same you do not need to select a new one. We will insert the automated text for the repair you select below just as we do for solitary flaps and grafts. Please note that at this time if you select a location with a different insurance zone you will need to override the ICD10 and CPT if appropriate.
Closure 3 Information: This tab is for additional flaps and grafts above and beyond our usual structured repairs.  Please note if you enter information here it will not currently bill and you will need to add the billing information manually.
Rhomboid Transposition Flap Text: The defect edges were debeveled with a #15 scalpel blade.  Given the location of the defect and the proximity to free margins a rhomboid transposition flap was deemed most appropriate.  Using a sterile surgical marker, an appropriate rhomboid flap was drawn incorporating the defect.    The area thus outlined was incised deep to adipose tissue with a #15 scalpel blade.  The skin margins were undermined to an appropriate distance in all directions utilizing iris scissors.
Non-Graft Cartilage Fenestration Text: The cartilage was fenestrated with a 2mm punch biopsy to help facilitate healing.
Peng Advancement Flap Text: The defect edges were debeveled with a #15 scalpel blade.  Given the location of the defect, shape of the defect and the proximity to free margins a Peng advancement flap was deemed most appropriate.  Using a sterile surgical marker, an appropriate advancement flap was drawn incorporating the defect and placing the expected incisions within the relaxed skin tension lines where possible. The area thus outlined was incised deep to adipose tissue with a #15 scalpel blade.  The skin margins were undermined to an appropriate distance in all directions utilizing iris scissors.
Nostril Rim Text: The closure involved the nostril rim.
Mercedes Flap Text: The defect edges were debeveled with a #15 scalpel blade.  Given the location of the defect, shape of the defect and the proximity to free margins a Mercedes flap was deemed most appropriate.  Using a sterile surgical marker, an appropriate advancement flap was drawn incorporating the defect and placing the expected incisions within the relaxed skin tension lines where possible. The area thus outlined was incised deep to adipose tissue with a #15 scalpel blade.  The skin margins were undermined to an appropriate distance in all directions utilizing iris scissors.
Unna Boot Text: An Unna boot was placed to help immobilize the limb and facilitate more rapid healing.
Medical Necessity Statement: Based on my medical judgement, Mohs surgery is the most appropriate treatment for this cancer compared to other treatments.
Area L Indication Text: Tumors in this location are included in Area L (trunk and extremities).  Mohs surgery is indicated for larger tumors, or tumors with aggressive histologic features, in these anatomic locations.
Dressing (No Sutures): pressure dressing with telfa
Wound Care: Vaseline
Information: Selecting Yes will display possible errors in your note based on the variables you have selected. This validation is only offered as a suggestion for you. PLEASE NOTE THAT THE VALIDATION TEXT WILL BE REMOVED WHEN YOU FINALIZE YOUR NOTE. IF YOU WANT TO FAX A PRELIMINARY NOTE YOU WILL NEED TO TOGGLE THIS TO 'NO' IF YOU DO NOT WANT IT IN YOUR FAXED NOTE.
Posterior Auricular Interpolation Flap Text: A decision was made to reconstruct the defect utilizing an interpolation axial flap and a staged reconstruction.  A telfa template was made of the defect.  This telfa template was then used to outline the posterior auricular interpolation flap.  The donor area for the pedicle flap was then injected with anesthesia.  The flap was excised through the skin and subcutaneous tissue down to the layer of the underlying musculature.  The pedicle flap was carefully excised within this deep plane to maintain its blood supply.  The edges of the donor site were undermined.   The donor site was closed in a primary fashion.  The pedicle was then rotated into position and sutured.  Once the tube was sutured into place, adequate blood supply was confirmed with blanching and refill.  The pedicle was then wrapped with xeroform gauze and dressed appropriately with a telfa and gauze bandage to ensure continued blood supply and protect the attached pedicle.
Rhombic Flap Text: The defect edges were debeveled with a #15 scalpel blade.  Given the location of the defect and the proximity to free margins a rhombic flap was deemed most appropriate.  Using a sterile surgical marker, an appropriate rhombic flap was drawn incorporating the defect.    The area thus outlined was incised deep to adipose tissue with a #15 scalpel blade.  The skin margins were undermined to an appropriate distance in all directions utilizing iris scissors.
Manual Repair Warning Statement: We plan on removing the manually selected variable below in favor of our much easier automatic structured text blocks found in the previous tab. We decided to do this to help make the flow better and give you the full power of structured data. Manual selection is never going to be ideal in our platform and I would encourage you to avoid using manual selection from this point on, especially since I will be sunsetting this feature. It is important that you do one of two things with the customized text below. First, you can save all of the text in a word file so you can have it for future reference. Second, transfer the text to the appropriate area in the Library tab. Lastly, if there is a flap or graft type which we do not have you need to let us know right away so I can add it in before the variable is hidden. No need to panic, we plan to give you roughly 6 months to make the change.
Localized Dermabrasion With Wire Brush Text: The patient was draped in routine manner.  Localized dermabrasion using 3 x 17 mm wire brush was performed in routine manner to papillary dermis. This spot dermabrasion is being performed to complete skin cancer reconstruction. It also will eliminate the other sun damaged precancerous cells that are known to be part of the regional effect of a lifetime's worth of sun exposure. This localized dermabrasion is therapeutic and should not be considered cosmetic in any regard.
Primary Defect Width In Cm (Final Defect Size - Required For Flaps/Grafts): 3
Depth Of Tumor Invasion (For Histology): muscle
Secondary Intention Text (Leave Blank If You Do Not Want): The defect will heal with secondary intention.
Consent Type: Consent 1 (Standard)
O-Z Flap Text: The defect edges were debeveled with a #15 scalpel blade.  Given the location of the defect, shape of the defect and the proximity to free margins an O-Z flap was deemed most appropriate.  Using a sterile surgical marker, an appropriate transposition flap was drawn incorporating the defect and placing the expected incisions within the relaxed skin tension lines where possible. The area thus outlined was incised deep to adipose tissue with a #15 scalpel blade.  The skin margins were undermined to an appropriate distance in all directions utilizing iris scissors.
O-Z Plasty Text: The defect edges were debeveled with a #15 scalpel blade.  Given the location of the defect, shape of the defect and the proximity to free margins an O-Z plasty (double transposition flap) was deemed most appropriate.  Using a sterile surgical marker, the appropriate transposition flaps were drawn incorporating the defect and placing the expected incisions within the relaxed skin tension lines where possible.    The area thus outlined was incised deep to adipose tissue with a #15 scalpel blade.  The skin margins were undermined to an appropriate distance in all directions utilizing iris scissors.  Hemostasis was achieved with electrocautery.  The flaps were then transposed into place, one clockwise and the other counterclockwise, and anchored with interrupted buried subcutaneous sutures.
Repair Anesthesia Method: local infiltration
Complex Repair And Flap Additional Text (Will Appearing After The Standard Complex Repair Text): The complex repair was not sufficient to completely close the primary defect. The remaining additional defect was repaired with the flap mentioned below.
Detail Level: Detailed
Retention Suture Bite Size: 1 mm
Mastoid Interpolation Flap Text: A decision was made to reconstruct the defect utilizing an interpolation axial flap and a staged reconstruction.  A telfa template was made of the defect.  This telfa template was then used to outline the mastoid interpolation flap.  The donor area for the pedicle flap was then injected with anesthesia.  The flap was excised through the skin and subcutaneous tissue down to the layer of the underlying musculature.  The pedicle flap was carefully excised within this deep plane to maintain its blood supply.  The edges of the donor site were undermined.   The donor site was closed in a primary fashion.  The pedicle was then rotated into position and sutured.  Once the tube was sutured into place, adequate blood supply was confirmed with blanching and refill.  The pedicle was then wrapped with xeroform gauze and dressed appropriately with a telfa and gauze bandage to ensure continued blood supply and protect the attached pedicle.
No Repair - Repaired With Adjacent Surgical Defect Text (Leave Blank If You Do Not Want): After obtaining clear surgical margins the defect was repaired concurrently with another surgical defect which was in close approximation.
Graft Donor Site Bandage (Optional-Leave Blank If You Don't Want In Note): A bolster was fitted to the graft site and sewn into place. A pressure bandage were applied to the donor site and over the bolster.
Brow Lift Text: A midfrontal incision was made medially to the defect to allow access to the tissues just superior to the left eyebrow. Following careful dissection inferiorly in a supraperiosteal plane to the level of the left eyebrow, several 3-0 monocryl sutures were used to resuspend the eyebrow orbicularis oculi muscular unit to the superior frontal bone periosteum. This resulted in an appropriate reapproximation of static eyebrow symmetry and correction of the left brow ptosis.
Tumor Debulked?: curette
Dermal Autograft Text: The defect edges were debeveled with a #15 scalpel blade.  Given the location of the defect, shape of the defect and the proximity to free margins a dermal autograft was deemed most appropriate.  Using a sterile surgical marker, the primary defect shape was transferred to the donor site. The area thus outlined was incised deep to adipose tissue with a #15 scalpel blade.  The harvested graft was then trimmed of adipose and epidermal tissue until only dermis was left.  The skin graft was then placed in the primary defect and oriented appropriately.
Consent 2/Introductory Paragraph: Mohs surgery was explained to the patient and consent was obtained. The risks, benefits and alternatives to therapy were discussed in detail. Specifically, the risks of infection, scarring, bleeding, prolonged wound healing, incomplete removal, allergy to anesthesia, nerve injury and recurrence were addressed. Prior to the procedure, the treatment site was clearly identified and confirmed by the patient. All components of Universal Protocol/PAUSE Rule completed.
Bilobed Flap Text: The defect edges were debeveled with a #15 scalpel blade.  Given the location of the defect and the proximity to free margins a bilobe flap was deemed most appropriate.  Using a sterile surgical marker, an appropriate bilobe flap drawn around the defect.    The area thus outlined was incised deep to adipose tissue with a #15 scalpel blade.  The skin margins were undermined to an appropriate distance in all directions utilizing iris scissors.
Why Was The Change Made?: Please Select the Appropriate Response
Width Of Defect Perpendicular To Closure In Cm (Required): 1.2
Repair Hemostasis (Optional): Pinpoint electrocautery
Island Pedicle Flap-Requiring Vessel Identification Text: The defect edges were debeveled with a #15 scalpel blade.  Given the location of the defect, shape of the defect and the proximity to free margins an island pedicle advancement flap was deemed most appropriate.  Using a sterile surgical marker, an appropriate advancement flap was drawn, based on the axial vessel mentioned above, incorporating the defect, outlining the appropriate donor tissue and placing the expected incisions within the relaxed skin tension lines where possible.    The area thus outlined was incised deep to adipose tissue with a #15 scalpel blade.  The skin margins were undermined to an appropriate distance in all directions around the primary defect and laterally outward around the island pedicle utilizing iris scissors.  There was minimal undermining beneath the pedicle flap.
Double Z Plasty Text: The lesion was extirpated to the level of the fat with a #15 scalpel blade. Given the location of the defect, shape of the defect and the proximity to free margins a double Z-plasty was deemed most appropriate for repair. Using a sterile surgical marker, the appropriate transposition arms of the double Z-plasty were drawn incorporating the defect and placing the expected incisions within the relaxed skin tension lines where possible. The area thus outlined was incised deep to adipose tissue with a #15 scalpel blade. The skin margins were undermined to an appropriate distance in all directions utilizing iris scissors. The opposing transposition arms were then transposed and carried over into place in opposite direction and anchored with interrupted buried subcutaneous sutures.
Epidermal Autograft Text: The defect edges were debeveled with a #15 scalpel blade.  Given the location of the defect, shape of the defect and the proximity to free margins an epidermal autograft was deemed most appropriate.  Using a sterile surgical marker, the primary defect shape was transferred to the donor site. The epidermal graft was then harvested.  The skin graft was then placed in the primary defect and oriented appropriately.
Tumor Depth: Less than 6mm from granular layer and no invasion beyond the subcutaneous fat
Orbicularis Oris Muscle Flap Text: The defect edges were debeveled with a #15 scalpel blade.  Given that the defect affected the competency of the oral sphincter an orbicularis oris muscle flap was deemed most appropriate to restore this competency and normal muscle function.  Using a sterile surgical marker, an appropriate flap was drawn incorporating the defect. The area thus outlined was incised with a #15 scalpel blade.
Graft Donor Site Epidermal Sutures (Optional): Dermabond
Spiral Flap Text: The defect edges were debeveled with a #15 scalpel blade.  Given the location of the defect, shape of the defect and the proximity to free margins a spiral flap was deemed most appropriate.  Using a sterile surgical marker, an appropriate rotation flap was drawn incorporating the defect and placing the expected incisions within the relaxed skin tension lines where possible. The area thus outlined was incised deep to adipose tissue with a #15 scalpel blade.  The skin margins were undermined to an appropriate distance in all directions utilizing iris scissors.
Where Do You Want The Question To Include Opioid Counseling Located?: Case Summary Tab
Rectangular Flap Text: The defect edges were debeveled with a #15 scalpel blade. Given the location of the defect and the proximity to free margins a rectangular flap was deemed most appropriate. Using a sterile surgical marker, an appropriate rectangular flap was drawn incorporating the defect. The area thus outlined was incised deep to adipose tissue with a #15 scalpel blade. The skin margins were undermined to an appropriate distance in all directions utilizing iris scissors. Following this, the designed flap was carried over into the primary defect and sutured into place.
Xenograft Text: The defect edges were debeveled with a #15 scalpel blade.  Given the location of the defect, shape of the defect and the proximity to free margins a xenograft was deemed most appropriate.  The graft was then trimmed to fit the size of the defect.  The graft was then placed in the primary defect and oriented appropriately.
Subsequent Stages Histo Method Verbiage: Using a similar technique to that described above, a thin layer of tissue was removed from all areas where tumor was visible on the previous stage.  The tissue was again oriented, mapped, dyed, and processed as above.
Surgeon/Pathologist Verbiage (Will Incorporate Name Of Surgeon From Intro If Not Blank): operated in two distinct and integrated capacities as the surgeon and pathologist.
Area M Indication Text: Tumors in this location are included in Area M (cheek, forehead, scalp, neck, jawline and pretibial skin).  Mohs surgery is indicated for tumors in these anatomic locations.
Hatchet Flap Text: The defect edges were debeveled with a #15 scalpel blade.  Given the location of the defect, shape of the defect and the proximity to free margins a hatchet flap was deemed most appropriate.  Using a sterile surgical marker, an appropriate hatchet flap was drawn incorporating the defect and placing the expected incisions within the relaxed skin tension lines where possible.    The area thus outlined was incised deep to adipose tissue with a #15 scalpel blade.  The skin margins were undermined to an appropriate distance in all directions utilizing iris scissors.
Alternatives Discussed Intro (Do Not Add Period): I discussed alternative treatments to Mohs surgery and specifically discussed the risks and benefits of
H Plasty Text: Given the location of the defect, shape of the defect and the proximity to free margins a H-plasty was deemed most appropriate for repair.  Using a sterile surgical marker, the appropriate advancement arms of the H-plasty were drawn incorporating the defect and placing the expected incisions within the relaxed skin tension lines where possible. The area thus outlined was incised deep to adipose tissue with a #15 scalpel blade. The skin margins were undermined to an appropriate distance in all directions utilizing iris scissors.  The opposing advancement arms were then advanced into place in opposite direction and anchored with interrupted buried subcutaneous sutures.
Consent (Temporal Branch)/Introductory Paragraph: The rationale for Mohs was explained to the patient and consent was obtained. The risks, benefits and alternatives to therapy were discussed in detail. Specifically, the risks of damage to the temporal branch of the facial nerve, infection, scarring, bleeding, prolonged wound healing, incomplete removal, allergy to anesthesia, and recurrence were addressed. Prior to the procedure, the treatment site was clearly identified and confirmed by the patient. All components of Universal Protocol/PAUSE Rule completed.
Helical Rim Advancement Flap Text: The defect edges were debeveled with a #15 blade scalpel.  Given the location of the defect and the proximity to free margins (helical rim) a double helical rim advancement flap was deemed most appropriate.  Using a sterile surgical marker, the appropriate advancement flaps were drawn incorporating the defect and placing the expected incisions between the helical rim and antihelix where possible.  The area thus outlined was incised through and through with a #15 scalpel blade.  With a skin hook and iris scissors, the flaps were gently and sharply undermined and freed up.
Which Instrument Did You Use For Dermabrasion?: Wire Brush
Consent (Scalp)/Introductory Paragraph: The rationale for Mohs was explained to the patient and consent was obtained. The risks, benefits and alternatives to therapy were discussed in detail. Specifically, the risks of changes in hair growth pattern secondary to repair, infection, scarring, bleeding, prolonged wound healing, incomplete removal, allergy to anesthesia, nerve injury and recurrence were addressed. Prior to the procedure, the treatment site was clearly identified and confirmed by the patient. All components of Universal Protocol/PAUSE Rule completed.
Consent (Lip)/Introductory Paragraph: The rationale for Mohs was explained to the patient and consent was obtained. The risks, benefits and alternatives to therapy were discussed in detail. Specifically, the risks of lip deformity, changes in the oral aperture, infection, scarring, bleeding, prolonged wound healing, incomplete removal, allergy to anesthesia, nerve injury and recurrence were addressed. Prior to the procedure, the treatment site was clearly identified and confirmed by the patient. All components of Universal Protocol/PAUSE Rule completed.
Cheiloplasty (Complex) Text: A decision was made to reconstruct the defect with a  cheiloplasty.  The defect was undermined extensively.  Additional obicularis oris muscle was excised with a 15 blade scalpel.  The defect was converted into a full thickness wedge to facilite a better cosmetic result.  Small vessels were then tied off with 5-0 monocyrl. The obicularis oris, superficial fascia, adipose and dermis were then reapproximated.  After the deeper layers were approximated the epidermis was reapproximated with particular care given to realign the vermilion border.
W Plasty Text: The lesion was extirpated to the level of the fat with a #15 scalpel blade.  Given the location of the defect, shape of the defect and the proximity to free margins a W-plasty was deemed most appropriate for repair.  Using a sterile surgical marker, the appropriate transposition arms of the W-plasty were drawn incorporating the defect and placing the expected incisions within the relaxed skin tension lines where possible.    The area thus outlined was incised deep to adipose tissue with a #15 scalpel blade.  The skin margins were undermined to an appropriate distance in all directions utilizing iris scissors.  The opposing transposition arms were then transposed into place in opposite direction and anchored with interrupted buried subcutaneous sutures.
Wound Care (No Sutures): Petrolatum
Retention Suture Text: Retention sutures were placed to support the closure and prevent dehiscence.
Vermilion Border Text: The closure involved the vermilion border.
Bilateral Helical Rim Advancement Flap Text: The defect edges were debeveled with a #15 blade scalpel.  Given the location of the defect and the proximity to free margins (helical rim) a bilateral helical rim advancement flap was deemed most appropriate.  Using a sterile surgical marker, the appropriate advancement flaps were drawn incorporating the defect and placing the expected incisions between the helical rim and antihelix where possible.  The area thus outlined was incised through and through with a #15 scalpel blade.  With a skin hook and iris scissors, the flaps were gently and sharply undermined and freed up.
Deep Sutures: 5-0 Monocryl
Abbe Flap (Lower To Upper Lip) Text: The defect of the upper lip was assessed and measured.  Given the location and size of the defect, an Abbe flap was deemed most appropriate.  Using a sterile surgical marker, an appropriate Abbe flap was measured and drawn on the lower lip. Local anesthesia was then infiltrated. A scalpel was then used to incise the upper lip through and through the skin, vermilion, muscle and mucosa, leaving the flap pedicled on the opposite side.  The flap was then rotated and transferred to the lower lip defect.  The flap was then sutured into place with a three layer technique, closing the orbicularis oris muscle layer with subcutaneous buried sutures, followed by a mucosal layer and an epidermal layer.
V-Y Plasty Text: The defect edges were debeveled with a #15 scalpel blade.  Given the location of the defect, shape of the defect and the proximity to free margins an V-Y advancement flap was deemed most appropriate.  Using a sterile surgical marker, an appropriate advancement flap was drawn incorporating the defect and placing the expected incisions within the relaxed skin tension lines where possible.    The area thus outlined was incised deep to adipose tissue with a #15 scalpel blade.  The skin margins were undermined to an appropriate distance in all directions utilizing iris scissors.
Composite Graft Text: The defect edges were debeveled with a #15 scalpel blade.  Given the location of the defect, shape of the defect, the proximity to free margins and the fact the defect was full thickness a composite graft was deemed most appropriate.  The defect was outline and then transferred to the donor site.  A full thickness graft was then excised from the donor site. The graft was then placed in the primary defect, oriented appropriately and then sutured into place.  The secondary defect was then repaired using a primary closure.
Initial Size Of Lesion: 1.9
Trilobed Flap Text: The defect edges were debeveled with a #15 scalpel blade.  Given the location of the defect and the proximity to free margins a trilobed flap was deemed most appropriate.  Using a sterile surgical marker, an appropriate trilobed flap drawn around the defect.    The area thus outlined was incised deep to adipose tissue with a #15 scalpel blade.  The skin margins were undermined to an appropriate distance in all directions utilizing iris scissors.
Previous Accession (Optional): [id number]
Burow's Advancement Flap Text: The defect edges were debeveled with a #15 scalpel blade.  Given the location of the defect and the proximity to free margins a Burow's advancement flap was deemed most appropriate.  Using a sterile surgical marker, the appropriate advancement flap was drawn incorporating the defect and placing the expected incisions within the relaxed skin tension lines where possible.    The area thus outlined was incised deep to adipose tissue with a #15 scalpel blade.  The skin margins were undermined to an appropriate distance in all directions utilizing iris scissors.
X Size Of Lesion In Cm (Optional): 1.7
Hemigard Intro: Due to skin fragility and wound tension, it was decided to use HEMIGARD adhesive retention suture devices to permit a linear closure. The skin was cleaned and dried for a 6cm distance away from the wound. Excessive hair, if present, was removed to allow for adhesion.
Inflammation Suggestive Of Cancer Camouflage Histology Text: There was a dense lymphocytic infiltrate which prevented adequate histologic evaluation of adjacent structures.
Helical Rim Text: The closure involved the helical rim.
O-T Advancement Flap Text: The defect edges were debeveled with a #15 scalpel blade.  Given the location of the defect, shape of the defect and the proximity to free margins an O-T advancement flap was deemed most appropriate.  Using a sterile surgical marker, an appropriate advancement flap was drawn incorporating the defect and placing the expected incisions within the relaxed skin tension lines where possible.    The area thus outlined was incised deep to adipose tissue with a #15 scalpel blade.  The skin margins were undermined to an appropriate distance in all directions utilizing iris scissors.
Donor Site Anesthesia Type: same as repair anesthesia
Localized Dermabrasion With Sand Papertext: The patient was draped in routine manner.  Localized dermabrasion using sterile sand paper was performed in routine manner to papillary dermis. This spot dermabrasion is being performed to complete skin cancer reconstruction. It also will eliminate the other sun damaged precancerous cells that are known to be part of the regional effect of a lifetime's worth of sun exposure. This localized dermabrasion is therapeutic and should not be considered cosmetic in any regard.
Suturegard Intro: Intraoperative tissue expansion was performed, utilizing the SUTUREGARD device, in order to reduce wound tension.
Melolabial Transposition Flap Text: The defect edges were debeveled with a #15 scalpel blade.  Given the location of the defect and the proximity to free margins a melolabial flap was deemed most appropriate.  Using a sterile surgical marker, an appropriate melolabial transposition flap was drawn incorporating the defect.    The area thus outlined was incised deep to adipose tissue with a #15 scalpel blade.  The skin margins were undermined to an appropriate distance in all directions utilizing iris scissors.
Hemigard Postcare Instructions: The HEMIGARD strips are to remain completely dry for at least 5-7 days.
Post-Care Instructions: I reviewed with the patient in detail post-care instructions. Patient is not to engage in any heavy lifting, exercise, or swimming for the next 14 days. Should the patient develop any fevers, chills, bleeding, severe pain patient will contact the office immediately.
Bilobed Transposition Flap Text: The defect edges were debeveled with a #15 scalpel blade.  Given the location of the defect and the proximity to free margins a bilobed transposition flap was deemed most appropriate.  Using a sterile surgical marker, an appropriate bilobe flap drawn around the defect.    The area thus outlined was incised deep to adipose tissue with a #15 scalpel blade.  The skin margins were undermined to an appropriate distance in all directions utilizing iris scissors.
Home Suture Removal Text: Patient was provided instructions on removing sutures and will remove their sutures at home.  If they have any questions or difficulties they will call the office.
No Residual Tumor Seen Histology Text: There were no malignant cells seen in the sections examined.
Purse String (Intermediate) Text: Given the location of the defect and the characteristics of the surrounding skin a purse string intermediate closure was deemed most appropriate.  Undermining was performed circumfirentially around the surgical defect.  A purse string suture was then placed and tightened.
Purse String (Simple) Text: Given the location of the defect and the characteristics of the surrounding skin a purse string closure was deemed most appropriate.  Undermining was performed circumfirentially around the surgical defect.  A purse string suture was then placed and tightened.
Banner Transposition Flap Text: The defect edges were debeveled with a #15 scalpel blade.  Given the location of the defect and the proximity to free margins a Banner transposition flap was deemed most appropriate.  Using a sterile surgical marker, an appropriate flap drawn around the defect. The area thus outlined was incised deep to adipose tissue with a #15 scalpel blade.  The skin margins were undermined to an appropriate distance in all directions utilizing iris scissors.
Complex Repair And Graft Additional Text (Will Appearing After The Standard Complex Repair Text): The complex repair was not sufficient to completely close the primary defect. The remaining additional defect was repaired with the graft mentioned below.
Cheek Interpolation Flap Text: A decision was made to reconstruct the defect utilizing an interpolation axial flap and a staged reconstruction.  A telfa template was made of the defect.  This telfa template was then used to outline the Cheek Interpolation flap.  The donor area for the pedicle flap was then injected with anesthesia.  The flap was excised through the skin and subcutaneous tissue down to the layer of the underlying musculature.  The interpolation flap was carefully excised within this deep plane to maintain its blood supply.  The edges of the donor site were undermined.   The donor site was closed in a primary fashion.  The pedicle was then rotated into position and sutured.  Once the tube was sutured into place, adequate blood supply was confirmed with blanching and refill.  The pedicle was then wrapped with xeroform gauze and dressed appropriately with a telfa and gauze bandage to ensure continued blood supply and protect the attached pedicle.
Zygomaticofacial Flap Text: Given the location of the defect, shape of the defect and the proximity to free margins a zygomaticofacial flap was deemed most appropriate for repair.  Using a sterile surgical marker, the appropriate flap was drawn incorporating the defect and placing the expected incisions within the relaxed skin tension lines where possible. The area thus outlined was incised deep to adipose tissue with a #15 scalpel blade with preservation of a vascular pedicle.  The skin margins were undermined to an appropriate distance in all directions utilizing iris scissors.  The flap was then placed into the defect and anchored with interrupted buried subcutaneous sutures.
Advancement Flap (Double) Text: The defect edges were debeveled with a #15 scalpel blade.  Given the location of the defect and the proximity to free margins a double advancement flap was deemed most appropriate.  Using a sterile surgical marker, the appropriate advancement flaps were drawn incorporating the defect and placing the expected incisions within the relaxed skin tension lines where possible.    The area thus outlined was incised deep to adipose tissue with a #15 scalpel blade.  The skin margins were undermined to an appropriate distance in all directions utilizing iris scissors.
Staged Advancement Flap Text: The defect edges were debeveled with a #15 scalpel blade.  Given the location of the defect, shape of the defect and the proximity to free margins a staged advancement flap was deemed most appropriate.  Using a sterile surgical marker, an appropriate advancement flap was drawn incorporating the defect and placing the expected incisions within the relaxed skin tension lines where possible. The area thus outlined was incised deep to adipose tissue with a #15 scalpel blade.  The skin margins were undermined to an appropriate distance in all directions utilizing iris scissors.
Burow's Graft Text: The defect edges were debeveled with a #15 scalpel blade.  Given the location of the defect, shape of the defect, the proximity to free margins and the presence of a standing cone deformity a Burow's skin graft was deemed most appropriate. The standing cone was removed and this tissue was then trimmed to the shape of the primary defect. The adipose tissue was also removed until only dermis and epidermis were left.  The skin margins of the secondary defect were undermined to an appropriate distance in all directions utilizing iris scissors.  The secondary defect was closed with interrupted buried subcutaneous sutures.  The skin edges were then re-apposed with running  sutures.  The skin graft was then placed in the primary defect and oriented appropriately.
Star Wedge Flap Text: The defect edges were debeveled with a #15 scalpel blade.  Given the location of the defect, shape of the defect and the proximity to free margins a star wedge flap was deemed most appropriate.  Using a sterile surgical marker, an appropriate rotation flap was drawn incorporating the defect and placing the expected incisions within the relaxed skin tension lines where possible. The area thus outlined was incised deep to adipose tissue with a #15 scalpel blade.  The skin margins were undermined to an appropriate distance in all directions utilizing iris scissors.
Nasalis-Muscle-Based Myocutaneous Island Pedicle Flap Text: Using a #15 blade, an incision was made around the donor flap to the level of the nasalis muscle. Wide lateral undermining was then performed in both the subcutaneous plane above the nasalis muscle, and in a submuscular plane just above periosteum. This allowed the formation of a free nasalis muscle axial pedicle (based on the angular artery) which was still attached to the actual cutaneous flap, increasing its mobility and vascular viability. Hemostasis was obtained with pinpoint electrocoagulation. The flap was mobilized into position and the pivotal anchor points positioned and stabilized with buried interrupted sutures. Subcutaneous and dermal tissues were closed in a multilayered fashion with sutures. Tissue redundancies were excised, and the epidermal edges were apposed without significant tension and sutured with sutures.
Mauc Instructions: By selecting yes to the question below the MAUC number will be added into the note.  This will be calculated automatically based on the diagnosis chosen, the size entered, the body zone selected (H,M,L) and the specific indications you chose. You will also have the option to override the Mohs AUC if you disagree with the automatically calculated number and this option is found in the Case Summary tab.
Rotation Flap Text: The defect edges were debeveled with a #15 scalpel blade.  Given the location of the defect, shape of the defect and the proximity to free margins a rotation flap was deemed most appropriate.  Using a sterile surgical marker, an appropriate rotation flap was drawn incorporating the defect and placing the expected incisions within the relaxed skin tension lines where possible.    The area thus outlined was incised deep to adipose tissue with a #15 scalpel blade.  The skin margins were undermined to an appropriate distance in all directions utilizing iris scissors.
Pinch Graft Text: The defect edges were debeveled with a #15 scalpel blade. Given the location of the defect, shape of the defect and the proximity to free margins a pinch graft was deemed most appropriate. Using a sterile surgical marker, the primary defect shape was transferred to the donor site. The area thus outlined was incised deep to adipose tissue with a #15 scalpel blade.  The harvested graft was then trimmed of adipose tissue until only dermis and epidermis was left. The skin margins of the secondary defect were undermined to an appropriate distance in all directions utilizing iris scissors.  The secondary defect was closed with interrupted buried subcutaneous sutures.  The skin edges were then re-apposed with running  sutures.  The skin graft was then placed in the primary defect and oriented appropriately.
Undermining Type: Entire Wound
Muscle Hinge Flap Text: The defect edges were debeveled with a #15 scalpel blade.  Given the size, depth and location of the defect and the proximity to free margins a muscle hinge flap was deemed most appropriate.  Using a sterile surgical marker, an appropriate hinge flap was drawn incorporating the defect. The area thus outlined was incised with a #15 scalpel blade.  The skin margins were undermined to an appropriate distance in all directions utilizing iris scissors.
Double O-Z Flap Text: The defect edges were debeveled with a #15 scalpel blade.  Given the location of the defect, shape of the defect and the proximity to free margins a Double O-Z flap was deemed most appropriate.  Using a sterile surgical marker, an appropriate transposition flap was drawn incorporating the defect and placing the expected incisions within the relaxed skin tension lines where possible. The area thus outlined was incised deep to adipose tissue with a #15 scalpel blade.  The skin margins were undermined to an appropriate distance in all directions utilizing iris scissors.
Dressing: xeroform gauze
Ear Star Wedge Flap Text: The defect edges were debeveled with a #15 blade scalpel.  Given the location of the defect and the proximity to free margins (helical rim) an ear star wedge flap was deemed most appropriate.  Using a sterile surgical marker, the appropriate flap was drawn incorporating the defect and placing the expected incisions between the helical rim and antihelix where possible.  The area thus outlined was incised through and through with a #15 scalpel blade.
Area H Indication Text: Tumors in this location are included in Area H (eyelids, eyebrows, nose, lips, chin, ear, pre-auricular, post-auricular, temple, genitalia, hands, feet, ankles and areola).  Tissue conservation is critical in these anatomic locations.
Intermediate Repair And Graft Additional Text (Will Appearing After The Standard Complex Repair Text): The intermediate repair was not sufficient to completely close the primary defect. The remaining additional defect was repaired with the graft mentioned below.
Estlander Flap (Upper To Lower Lip) Text: The defect of the lower lip was assessed and measured.  Given the location and size of the defect, an Estlander flap was deemed most appropriate.  Using a sterile surgical marker, an appropriate Estlander flap was measured and drawn on the upper lip. Local anesthesia was then infiltrated. A scalpel was then used to incise the lateral aspect of the flap, through skin, muscle and mucosa, leaving the flap pedicled medially.  The flap was then rotated and positioned to fill the lower lip defect.  The flap was then sutured into place with a three layer technique, closing the orbicularis oris muscle layer with subcutaneous buried sutures, followed by a mucosal layer and an epidermal layer.
Hemostasis: Electrocautery
Nasolabial Transposition Flap Text: The defect edges were debeveled with a #15 scalpel blade.  Given the size, depth and location of the defect and the proximity to free margins a nasolabial transposition flap was deemed most appropriate. Using a sterile surgical marker, an appropriate flap was drawn incorporating the defect. The area thus outlined was incised with a #15 scalpel blade. The skin margins were undermined to an appropriate distance in all directions utilizing iris scissors. Following this, the designed flap was carried into the primary defect and sutured into place.
Presence Of Scar Tissue (For Histology): present
Abbe Flap (Upper To Lower Lip) Text: The defect of the lower lip was assessed and measured.  Given the location and size of the defect, an Abbe flap was deemed most appropriate.  Using a sterile surgical marker, an appropriate Abbe flap was measured and drawn on the upper lip. Local anesthesia was then infiltrated.  A scalpel was then used to incise the upper lip through and through the skin, vermilion, muscle and mucosa, leaving the flap pedicled on the opposite side.  The flap was then rotated and transferred to the lower lip defect.  The flap was then sutured into place with a three layer technique, closing the orbicularis oris muscle layer with subcutaneous buried sutures, followed by a mucosal layer and an epidermal layer.
Primary Defect Length In Cm (Final Defect Size - Required For Flaps/Grafts): 3.2
Graft Cartilage Fenestration Text: The cartilage was fenestrated with a 2mm punch biopsy to help facilitate graft survival and healing.
Nasal Turnover Hinge Flap Text: The defect edges were debeveled with a #15 scalpel blade.  Given the size, depth, location of the defect and the defect being full thickness a nasal turnover hinge flap was deemed most appropriate.  Using a sterile surgical marker, an appropriate hinge flap was drawn incorporating the defect. The area thus outlined was incised with a #15 scalpel blade. The flap was designed to recreate the nasal mucosal lining and the alar rim. The skin margins were undermined to an appropriate distance in all directions utilizing iris scissors.
Ftsg Text: The defect edges were debeveled with a #15 scalpel blade.  Given the location of the defect, shape of the defect and the proximity to free margins a full thickness skin graft was deemed most appropriate.  Using a sterile surgical marker, the primary defect shape was transferred to the donor site. The area thus outlined was incised deep to adipose tissue with a #15 scalpel blade.  The harvested graft was then trimmed of adipose tissue until only dermis and epidermis was left.  The skin margins of the secondary defect were undermined to an appropriate distance in all directions utilizing iris scissors.  The secondary defect was closed with interrupted buried subcutaneous sutures.  The skin edges were then re-apposed with running  sutures.  The skin graft was then placed in the primary defect and oriented appropriately.
Consent (Spinal Accessory)/Introductory Paragraph: The rationale for Mohs was explained to the patient and consent was obtained. The risks, benefits and alternatives to therapy were discussed in detail. Specifically, the risks of damage to the spinal accessory nerve, infection, scarring, bleeding, prolonged wound healing, incomplete removal, allergy to anesthesia, and recurrence were addressed. Prior to the procedure, the treatment site was clearly identified and confirmed by the patient. All components of Universal Protocol/PAUSE Rule completed.
O-T Plasty Text: The defect edges were debeveled with a #15 scalpel blade.  Given the location of the defect, shape of the defect and the proximity to free margins an O-T plasty was deemed most appropriate.  Using a sterile surgical marker, an appropriate O-T plasty was drawn incorporating the defect and placing the expected incisions within the relaxed skin tension lines where possible.    The area thus outlined was incised deep to adipose tissue with a #15 scalpel blade.  The skin margins were undermined to an appropriate distance in all directions utilizing iris scissors.
Undermining Location (Optional): in the superficial subcutaneous fat
Partial Purse String (Simple) Text: Given the location of the defect and the characteristics of the surrounding skin a simple purse string closure was deemed most appropriate.  Undermining was performed circumfirentially around the surgical defect.  A purse string suture was then placed and tightened. Wound tension only allowed a partial closure of the circular defect.
Interpolation Flap Text: A decision was made to reconstruct the defect utilizing an interpolation axial flap and a staged reconstruction.  A telfa template was made of the defect.  This telfa template was then used to outline the interpolation flap.  The donor area for the pedicle flap was then injected with anesthesia.  The flap was excised through the skin and subcutaneous tissue down to the layer of the underlying musculature.  The interpolation flap was carefully excised within this deep plane to maintain its blood supply.  The edges of the donor site were undermined.   The donor site was closed in a primary fashion.  The pedicle was then rotated into position and sutured.  Once the tube was sutured into place, adequate blood supply was confirmed with blanching and refill.  The pedicle was then wrapped with xeroform gauze and dressed appropriately with a telfa and gauze bandage to ensure continued blood supply and protect the attached pedicle.
Which Eyelid Repair Cpt Are You Using?: 43799
Adjacent Tissue Transfer Text: The defect edges were debeveled with a #15 scalpel blade.  Given the location of the defect and the proximity to free margins an adjacent tissue transfer was deemed most appropriate.  Using a sterile surgical marker, an appropriate flap was drawn incorporating the defect and placing the expected incisions within the relaxed skin tension lines where possible.    The area thus outlined was incised deep to adipose tissue with a #15 scalpel blade.  The skin margins were undermined to an appropriate distance in all directions utilizing iris scissors.
Eyelid Partial Thickness Repair - 93010: The eyelid defect was partial thickness which required a wedge repair of the eyelid. Special care was taken to ensure that the eyelid margin was realligned when placing sutures.
Epidermal Sutures: 6-0 Prolene
Crescentic Advancement Flap Text: The defect edges were debeveled with a #15 scalpel blade.  Given the location of the defect and the proximity to free margins a crescentic advancement flap was deemed most appropriate.  Using a sterile surgical marker, the appropriate advancement flap was drawn incorporating the defect and placing the expected incisions within the relaxed skin tension lines where possible.    The area thus outlined was incised deep to adipose tissue with a #15 scalpel blade.  The skin margins were undermined to an appropriate distance in all directions utilizing iris scissors.
Mohs Rapid Report Verbiage: The area of clinically evident tumor was marked with skin marking ink and appropriately hatched.  The initial incision was made following the Mohs approach through the skin.  The specimen was taken to the lab, divided into the necessary number of pieces, chromacoded and processed according to the Mohs protocol.  This was repeated in successive stages until a tumor free defect was achieved.
Advancement Flap (Single) Text: The defect edges were debeveled with a #15 scalpel blade.  Given the location of the defect and the proximity to free margins a single advancement flap was deemed most appropriate.  Using a sterile surgical marker, an appropriate advancement flap was drawn incorporating the defect and placing the expected incisions within the relaxed skin tension lines where possible.    The area thus outlined was incised deep to adipose tissue with a #15 scalpel blade.  The skin margins were undermined to an appropriate distance in all directions utilizing iris scissors.
Bilateral Rotation Flap Text: The defect edges were debeveled with a #15 scalpel blade. Given the location of the defect, shape of the defect and the proximity to free margins a bilateral rotation flap was deemed most appropriate. Using a sterile surgical marker, an appropriate rotation flap was drawn incorporating the defect and placing the expected incisions within the relaxed skin tension lines where possible. The area thus outlined was incised deep to adipose tissue with a #15 scalpel blade. The skin margins were undermined to an appropriate distance in all directions utilizing iris scissors. Following this, the designed flap was carried over into the primary defect and sutured into place.
Distance Of Undermining In Cm (Required): 1.5
Bcc Infiltrative Histology Text: There were numerous aggregates of basaloid cells demonstrating an infiltrative pattern.
Staging Info: By selecting yes to the question above you will include information on AJCC 8 tumor staging in your Mohs note. Information on tumor staging will be automatically added for SCCs on the head and neck. AJCC 8 includes tumor size, tumor depth, perineural involvement and bone invasion.
Z Plasty Text: The lesion was extirpated to the level of the fat with a #15 scalpel blade.  Given the location of the defect, shape of the defect and the proximity to free margins a Z-plasty was deemed most appropriate for repair.  Using a sterile surgical marker, the appropriate transposition arms of the Z-plasty were drawn incorporating the defect and placing the expected incisions within the relaxed skin tension lines where possible.    The area thus outlined was incised deep to adipose tissue with a #15 scalpel blade.  The skin margins were undermined to an appropriate distance in all directions utilizing iris scissors.  The opposing transposition arms were then transposed into place in opposite direction and anchored with interrupted buried subcutaneous sutures.
Mart-1 - Positive Histology Text: MART-1 staining demonstrates areas of higher density and clustering of melanocytes with Pagetoid spread upwards within the epidermis. The surgical margins are positive for tumor cells.
O-L Flap Text: The defect edges were debeveled with a #15 scalpel blade.  Given the location of the defect, shape of the defect and the proximity to free margins an O-L flap was deemed most appropriate.  Using a sterile surgical marker, an appropriate advancement flap was drawn incorporating the defect and placing the expected incisions within the relaxed skin tension lines where possible.    The area thus outlined was incised deep to adipose tissue with a #15 scalpel blade.  The skin margins were undermined to an appropriate distance in all directions utilizing iris scissors.

## 2024-12-05 NOTE — PROCEDURE: DIAGNOSIS COMMENT
Render Risk Assessment In Note?: no
Detail Level: Zone
Comment: Given location, depth of defect, bleeding during surgery, and need for patient to lift/care for his wife at home, we opted for partial closure today (CLC measured as summation of 3 points of closure) and will do wound care to central deepest portion of wound for several weeks until area is ready for FTSG.

## 2024-12-12 ENCOUNTER — APPOINTMENT (OUTPATIENT)
Dept: URBAN - METROPOLITAN AREA CLINIC 36 | Facility: CLINIC | Age: 64
Setting detail: DERMATOLOGY
End: 2024-12-12

## 2024-12-12 DIAGNOSIS — Z48.817 ENCOUNTER FOR SURGICAL AFTERCARE FOLLOWING SURGERY ON THE SKIN AND SUBCUTANEOUS TISSUE: ICD-10-CM

## 2024-12-12 PROCEDURE — ? POST-OP WOUND CHECK

## 2024-12-12 ASSESSMENT — LOCATION SIMPLE DESCRIPTION DERM: LOCATION SIMPLE: GLABELLA

## 2024-12-12 ASSESSMENT — LOCATION ZONE DERM: LOCATION ZONE: FACE

## 2024-12-12 ASSESSMENT — LOCATION DETAILED DESCRIPTION DERM: LOCATION DETAILED: GLABELLA

## 2024-12-12 NOTE — PROCEDURE: POST-OP WOUND CHECK
Detail Level: Detailed
Add 83361 Cpt? (Important Note: In 2017 The Use Of 72831 Is Being Tracked By Cms To Determine Future Global Period Reimbursement For Global Periods): no
Wound Evaluated By: BABATUNDE

## 2024-12-20 ENCOUNTER — APPOINTMENT (OUTPATIENT)
Dept: URBAN - METROPOLITAN AREA CLINIC 36 | Facility: CLINIC | Age: 64
Setting detail: DERMATOLOGY
End: 2024-12-20

## 2024-12-20 DIAGNOSIS — Z48.02 ENCOUNTER FOR REMOVAL OF SUTURES: ICD-10-CM

## 2024-12-20 PROCEDURE — ? SUTURE REMOVAL (GLOBAL PERIOD)

## 2024-12-20 PROCEDURE — ? PHOTO-DOCUMENTATION

## 2024-12-20 PROCEDURE — ? COUNSELING

## 2024-12-20 ASSESSMENT — LOCATION ZONE DERM: LOCATION ZONE: FACE

## 2024-12-20 ASSESSMENT — LOCATION DETAILED DESCRIPTION DERM: LOCATION DETAILED: GLABELLA

## 2024-12-20 ASSESSMENT — LOCATION SIMPLE DESCRIPTION DERM: LOCATION SIMPLE: GLABELLA

## 2024-12-20 NOTE — PROCEDURE: PHOTO-DOCUMENTATION
Detail Level: Zone
Photo Preface (Leave Blank If You Do Not Want): Photographs were obtained today
Details (Free Text): Evaluated by Dr. Padilla

## 2024-12-20 NOTE — PROCEDURE: SUTURE REMOVAL (GLOBAL PERIOD)
Detail Level: Simple
Add 34300 Cpt? (Important Note: In 2017 The Use Of 44363 Is Being Tracked By Cms To Determine Future Global Period Reimbursement For Global Periods): no

## 2025-01-10 ENCOUNTER — APPOINTMENT (OUTPATIENT)
Dept: URBAN - METROPOLITAN AREA CLINIC 36 | Facility: CLINIC | Age: 65
Setting detail: DERMATOLOGY
End: 2025-01-10

## 2025-01-10 DIAGNOSIS — Z41.9 ENCOUNTER FOR PROCEDURE FOR PURPOSES OTHER THAN REMEDYING HEALTH STATE, UNSPECIFIED: ICD-10-CM

## 2025-01-10 DIAGNOSIS — Z48.817 ENCOUNTER FOR SURGICAL AFTERCARE FOLLOWING SURGERY ON THE SKIN AND SUBCUTANEOUS TISSUE: ICD-10-CM

## 2025-01-10 PROCEDURE — ? CO2RE

## 2025-01-10 PROCEDURE — ? POST-OP WOUND CHECK

## 2025-01-10 ASSESSMENT — LOCATION ZONE DERM: LOCATION ZONE: FACE

## 2025-01-10 ASSESSMENT — LOCATION DETAILED DESCRIPTION DERM: LOCATION DETAILED: GLABELLA

## 2025-01-10 ASSESSMENT — LOCATION SIMPLE DESCRIPTION DERM: LOCATION SIMPLE: GLABELLA

## 2025-01-10 NOTE — PROCEDURE: CO2RE
Energy (Mj): 80
Other Location: Glabella
Treatment Number: 1
Consent: Written consent obtained, risks reviewed including but not limited to crusting, scabbing, blistering, scarring, darker or lighter pigmentary change, incomplete improvement of dyschromia, wrinkles, prolonged erythema and facial swelling, infection and bleeding.
Add Another Laser Setting?: no
Post-Care Instructions: I reviewed with the patient in detail post-care instructions. Patient should avoid sun until area fully healed. Pt should apply vaseline to treated areas, and remove crusts gently with water-vinegar soaks.
Laser Mode: Deep
Detail Level: Detailed
Anesthesia Type: 1% lidocaine with epinephrine
External Cooling Fan Speed: 5
Eye Protection Text: Goggles were placed on the patient for the treatment.

## 2025-01-10 NOTE — PROCEDURE: POST-OP WOUND CHECK
Detail Level: Detailed
Add 76071 Cpt? (Important Note: In 2017 The Use Of 17124 Is Being Tracked By Cms To Determine Future Global Period Reimbursement For Global Periods): no
Quality 355: Unplanned Reoperation Within The 30 Day Postoperative Period: No return to the operating room for a surgical procedure, for complications of the principal operative procedure, within 30 days of the principal operative procedure
Quality 357: Surgical Site Infection (Ssi): No surgical site infection
Wound Evaluated By: Jarrett Padilla MD
Additional Comments: Central wound has granulated beautifully\\nDiscussed potential for contraction of scar as it continues to heal - start scar massage and silicone scar cream\\nAsked patient to notify us ASAP if he notices scar celine; we can consider delayed FTSG/etc PRN

## 2025-02-07 ENCOUNTER — APPOINTMENT (OUTPATIENT)
Dept: URBAN - METROPOLITAN AREA CLINIC 36 | Facility: CLINIC | Age: 65
Setting detail: DERMATOLOGY
End: 2025-02-07

## 2025-02-07 DIAGNOSIS — Z48.817 ENCOUNTER FOR SURGICAL AFTERCARE FOLLOWING SURGERY ON THE SKIN AND SUBCUTANEOUS TISSUE: ICD-10-CM

## 2025-02-07 DIAGNOSIS — Z41.9 ENCOUNTER FOR PROCEDURE FOR PURPOSES OTHER THAN REMEDYING HEALTH STATE, UNSPECIFIED: ICD-10-CM

## 2025-02-07 PROCEDURE — ? LASER RESURFACING

## 2025-02-07 PROCEDURE — ? POST-OP WOUND CHECK

## 2025-02-07 ASSESSMENT — LOCATION ZONE DERM: LOCATION ZONE: FACE

## 2025-02-07 ASSESSMENT — LOCATION DETAILED DESCRIPTION DERM: LOCATION DETAILED: GLABELLA

## 2025-02-07 ASSESSMENT — LOCATION SIMPLE DESCRIPTION DERM: LOCATION SIMPLE: GLABELLA

## 2025-02-07 NOTE — PROCEDURE: POST-OP WOUND CHECK
Detail Level: Detailed
Add 39531 Cpt? (Important Note: In 2017 The Use Of 73969 Is Being Tracked By Cms To Determine Future Global Period Reimbursement For Global Periods): no

## 2025-02-07 NOTE — PROCEDURE: LASER RESURFACING
Power (Feliz): 40
Laser Type: CO2Re laser (Deep)
Was A Corneal Shield Used?: No
Corneal Shield Text: A corneal shield was inserted after appropriate application of topical anesthesia.
Post-Care Instructions: I reviewed with the patient in detail post-care instructions. Patient should avoid sun until area fully healed. Pt should apply vaseline to treated areas, and remove crusts gently with water-vinegar soaks.
Wavelength: 10,600nm
Treatment Number: 1
Energy(Mj): 5
Number Of Passes: 2
Percent Coverage Per Pass (%): 0
Consent: Written consent obtained, risks reviewed including but not limited to crusting, scabbing, blistering, scarring, darker or lighter pigmentary change, incomplete improvement of dyschromia, wrinkles, prolonged erythema and facial swelling, infection and bleeding.
Detail Level: Detailed

## 2025-03-07 ENCOUNTER — APPOINTMENT (OUTPATIENT)
Dept: URBAN - METROPOLITAN AREA CLINIC 36 | Facility: CLINIC | Age: 65
Setting detail: DERMATOLOGY
End: 2025-03-07

## 2025-03-07 DIAGNOSIS — Z48.817 ENCOUNTER FOR SURGICAL AFTERCARE FOLLOWING SURGERY ON THE SKIN AND SUBCUTANEOUS TISSUE: ICD-10-CM

## 2025-03-07 DIAGNOSIS — Z41.9 ENCOUNTER FOR PROCEDURE FOR PURPOSES OTHER THAN REMEDYING HEALTH STATE, UNSPECIFIED: ICD-10-CM

## 2025-03-07 PROCEDURE — ? POST-OP WOUND CHECK

## 2025-03-07 PROCEDURE — ? LASER RESURFACING

## 2025-03-07 ASSESSMENT — LOCATION ZONE DERM: LOCATION ZONE: FACE

## 2025-03-07 ASSESSMENT — LOCATION SIMPLE DESCRIPTION DERM: LOCATION SIMPLE: GLABELLA

## 2025-03-07 ASSESSMENT — LOCATION DETAILED DESCRIPTION DERM: LOCATION DETAILED: GLABELLA

## 2025-03-07 NOTE — PROCEDURE: LASER RESURFACING
Pulse Duration (Usec): 0
Consent: Written consent obtained, risks reviewed including but not limited to crusting, scabbing, blistering, scarring, darker or lighter pigmentary change, incomplete improvement of dyschromia, wrinkles, prolonged erythema and facial swelling, infection and bleeding.
Was A Corneal Shield Used?: No
Number Of Passes: 2
Energy(Mj): 5
Post-Care Instructions: I reviewed with the patient in detail post-care instructions. Patient should avoid sun until area fully healed. Pt should apply vaseline to treated areas, and remove crusts gently with water-vinegar soaks.
Treatment Number: 1
Detail Level: Detailed
Corneal Shield Text: A corneal shield was inserted after appropriate application of topical anesthesia.
Laser Type: CO2Re laser (Deep)
Wavelength: 10,600nm
Power (Feliz): 40

## 2025-03-07 NOTE — PROCEDURE: POST-OP WOUND CHECK
Detail Level: Detailed
Add 30071 Cpt? (Important Note: In 2017 The Use Of 75604 Is Being Tracked By Cms To Determine Future Global Period Reimbursement For Global Periods): no

## 2025-04-03 ENCOUNTER — APPOINTMENT (OUTPATIENT)
Dept: URBAN - METROPOLITAN AREA CLINIC 36 | Facility: CLINIC | Age: 65
Setting detail: DERMATOLOGY
End: 2025-04-03

## 2025-04-03 DIAGNOSIS — Z48.817 ENCOUNTER FOR SURGICAL AFTERCARE FOLLOWING SURGERY ON THE SKIN AND SUBCUTANEOUS TISSUE: ICD-10-CM

## 2025-04-03 PROCEDURE — ? CO2RE

## 2025-04-03 PROCEDURE — ? POST-OP WOUND CHECK

## 2025-04-03 ASSESSMENT — LOCATION ZONE DERM: LOCATION ZONE: FACE

## 2025-04-03 ASSESSMENT — LOCATION SIMPLE DESCRIPTION DERM: LOCATION SIMPLE: GLABELLA

## 2025-04-03 ASSESSMENT — LOCATION DETAILED DESCRIPTION DERM: LOCATION DETAILED: GLABELLA

## 2025-04-03 NOTE — PROCEDURE: POST-OP WOUND CHECK
Additional Comments: Firm scar centrally - treated with core laser today and will follow up in 4-6 weeks\\nContinue scar massage and silicone
Wound Evaluated By: Valerie HODGE
Add 90316 Cpt? (Important Note: In 2017 The Use Of 84388 Is Being Tracked By Cms To Determine Future Global Period Reimbursement For Global Periods): no
Detail Level: Detailed

## 2025-04-03 NOTE — PROCEDURE: CO2RE
Energy (Mj): 80
Consent: Written consent obtained, risks reviewed including but not limited to crusting, scabbing, blistering, scarring, darker or lighter pigmentary change, incomplete improvement of dyschromia, wrinkles, prolonged erythema and facial swelling, infection and bleeding.
Was Eye Protection Used?: No
Treatment Number: 1
Laser Mode: Deep
Detail Level: Simple
Post-Care Instructions: I reviewed with the patient in detail post-care instructions. Patient should avoid sun until area fully healed. Pt should apply vaseline to treated areas, and remove crusts gently with water-vinegar soaks.
Eye Protection Text: Goggles were placed on the patient for the treatment.
Anesthesia Type: 1% lidocaine with epinephrine
Other Location: glabella
Fractional Coverage (%): 5